# Patient Record
Sex: MALE | Race: WHITE | NOT HISPANIC OR LATINO | Employment: OTHER | URBAN - METROPOLITAN AREA
[De-identification: names, ages, dates, MRNs, and addresses within clinical notes are randomized per-mention and may not be internally consistent; named-entity substitution may affect disease eponyms.]

---

## 2019-07-22 ENCOUNTER — TELEPHONE (OUTPATIENT)
Dept: FAMILY MEDICINE | Facility: CLINIC | Age: 74
End: 2019-07-22

## 2019-07-22 RX ORDER — IRBESARTAN 300 MG/1
300 TABLET ORAL DAILY
Refills: 5 | COMMUNITY
Start: 2019-07-22 | End: 2024-01-02

## 2019-07-22 NOTE — TELEPHONE ENCOUNTER
----- Message from Shabnam Cxo sent at 7/22/2019  1:55 PM CDT -----  Contact: Milton Calderon - [facility]  [phone]  Type:  RX Refill Request    Who Called:  Milton Calderon  Refill or New Rx: Refill  RX Name and Strength: Avapro 300mg  How is the patient currently taking it? (ex. 1XDay) 1X Day  Is this a 30 day or 90 day RX: 30 day  Preferred Pharmacy with phone number: [facility] [phone]  Local or Mail Order: Local  Ordering Provider: Pierre  Would the patient rather a call back or a response via [facility]?  Call back  Best Call Back Number: [phone]  Additional Information: Pt would like to switch back to this medication due to the other med is not covered on insurance.

## 2019-07-22 NOTE — TELEPHONE ENCOUNTER
Per dr margie braden to fill avapro 300 mg daily #90/pt last office visit was 07/10/19 pt  aware that med is called out to [facility] [phone] spoke to corie

## 2020-01-03 ENCOUNTER — OFFICE VISIT (OUTPATIENT)
Dept: URGENT CARE | Facility: CLINIC | Age: 75
End: 2020-01-03
Payer: MEDICARE

## 2020-01-03 VITALS
TEMPERATURE: 98 F | HEIGHT: 71 IN | WEIGHT: 190 LBS | HEART RATE: 73 BPM | SYSTOLIC BLOOD PRESSURE: 158 MMHG | RESPIRATION RATE: 18 BRPM | BODY MASS INDEX: 26.6 KG/M2 | OXYGEN SATURATION: 96 % | DIASTOLIC BLOOD PRESSURE: 91 MMHG

## 2020-01-03 DIAGNOSIS — J06.9 VIRAL URI WITH COUGH: Primary | ICD-10-CM

## 2020-01-03 PROCEDURE — 96372 THER/PROPH/DIAG INJ SC/IM: CPT | Mod: S$GLB,,, | Performed by: NURSE PRACTITIONER

## 2020-01-03 PROCEDURE — 99203 OFFICE O/P NEW LOW 30 MIN: CPT | Mod: 25,S$GLB,, | Performed by: NURSE PRACTITIONER

## 2020-01-03 PROCEDURE — 96372 PR INJECTION,THERAP/PROPH/DIAG2ST, IM OR SUBCUT: ICD-10-PCS | Mod: S$GLB,,, | Performed by: NURSE PRACTITIONER

## 2020-01-03 PROCEDURE — 99203 PR OFFICE/OUTPT VISIT, NEW, LEVL III, 30-44 MIN: ICD-10-PCS | Mod: 25,S$GLB,, | Performed by: NURSE PRACTITIONER

## 2020-01-03 RX ORDER — LEVOCETIRIZINE DIHYDROCHLORIDE 5 MG/1
5 TABLET, FILM COATED ORAL DAILY
Qty: 30 TABLET | Refills: 0 | Status: SHIPPED | OUTPATIENT
Start: 2020-01-03 | End: 2024-01-02

## 2020-01-03 RX ORDER — BUPROPION HYDROCHLORIDE 300 MG/1
TABLET ORAL
COMMUNITY
End: 2024-01-02 | Stop reason: SDUPTHER

## 2020-01-03 RX ORDER — NAPROXEN SODIUM 220 MG/1
81 TABLET, FILM COATED ORAL
COMMUNITY
End: 2024-01-02

## 2020-01-03 RX ORDER — BETAMETHASONE SODIUM PHOSPHATE AND BETAMETHASONE ACETATE 3; 3 MG/ML; MG/ML
9 INJECTION, SUSPENSION INTRA-ARTICULAR; INTRALESIONAL; INTRAMUSCULAR; SOFT TISSUE
Status: COMPLETED | OUTPATIENT
Start: 2020-01-03 | End: 2020-01-03

## 2020-01-03 RX ORDER — BUPROPION HYDROCHLORIDE 150 MG/1
TABLET ORAL
COMMUNITY
End: 2024-01-02

## 2020-01-03 RX ORDER — LAMOTRIGINE 200 MG/1
TABLET ORAL
COMMUNITY
End: 2024-01-02 | Stop reason: SDUPTHER

## 2020-01-03 RX ORDER — CODEINE PHOSPHATE AND GUAIFENESIN 10; 100 MG/5ML; MG/5ML
5 SOLUTION ORAL 3 TIMES DAILY PRN
Qty: 118 ML | Refills: 0 | Status: SHIPPED | OUTPATIENT
Start: 2020-01-03 | End: 2020-01-13

## 2020-01-03 RX ORDER — CLONAZEPAM 0.5 MG/1
0.5 TABLET ORAL 4 TIMES DAILY
COMMUNITY
Start: 2020-01-01 | End: 2024-01-02 | Stop reason: SDUPTHER

## 2020-01-03 RX ORDER — FLUTICASONE PROPIONATE 50 MCG
2 SPRAY, SUSPENSION (ML) NASAL DAILY
Qty: 1 BOTTLE | Refills: 0 | Status: SHIPPED | OUTPATIENT
Start: 2020-01-03 | End: 2024-01-02

## 2020-01-03 RX ADMIN — BETAMETHASONE SODIUM PHOSPHATE AND BETAMETHASONE ACETATE 9 MG: 3; 3 INJECTION, SUSPENSION INTRA-ARTICULAR; INTRALESIONAL; INTRAMUSCULAR; SOFT TISSUE at 06:01

## 2020-01-03 NOTE — PROGRESS NOTES
"Subjective:       Patient ID: Terrell Albrecth is a 74 y.o. male.    Vitals:  height is 5' 11" (1.803 m) and weight is 86.2 kg (190 lb). His temperature is 98.4 °F (36.9 °C). His blood pressure is 158/91 (abnormal) and his pulse is 73. His respiration is 18 and oxygen saturation is 96%.     Chief Complaint: Cough (1 MONTH NOW )    Cough   This is a new problem. The current episode started 1 to 4 weeks ago. The problem has been gradually worsening. The problem occurs constantly. The cough is non-productive. Associated symptoms include postnasal drip. Pertinent negatives include no chills, ear pain, eye redness, fever, hemoptysis, myalgias, rash, sore throat, shortness of breath or wheezing. Nothing aggravates the symptoms. He has tried OTC cough suppressant (RX FROM [facility] ) for the symptoms. The treatment provided no relief. There is no history of asthma, bronchitis or pneumonia.       Constitution: Negative for chills, sweating, fatigue and fever.   HENT: Positive for congestion and postnasal drip. Negative for ear pain, sinus pain, sinus pressure, sore throat and voice change.    Neck: Negative for painful lymph nodes.   Eyes: Negative for eye redness.   Respiratory: Positive for cough. Negative for chest tightness, sputum production, bloody sputum, COPD, shortness of breath, stridor, wheezing and asthma.    Gastrointestinal: Negative for nausea and vomiting.   Musculoskeletal: Negative for muscle ache.   Skin: Negative for rash.   Allergic/Immunologic: Negative for seasonal allergies and asthma.   Hematologic/Lymphatic: Negative for swollen lymph nodes.       Objective:      Physical Exam   Constitutional: He is oriented to person, place, and time. He appears well-developed and well-nourished. He is cooperative.  Non-toxic appearance. He does not appear ill. No distress.   HENT:   Head: Normocephalic and atraumatic.   Right Ear: Hearing, tympanic membrane, external ear and ear canal normal.   Left Ear: " Hearing, tympanic membrane, external ear and ear canal normal.   Nose: Rhinorrhea present. No mucosal edema or nasal deformity. No epistaxis. Right sinus exhibits no maxillary sinus tenderness and no frontal sinus tenderness. Left sinus exhibits no maxillary sinus tenderness and no frontal sinus tenderness.   Mouth/Throat: Uvula is midline and mucous membranes are normal. No trismus in the jaw. Normal dentition. No uvula swelling. Posterior oropharyngeal erythema present. No oropharyngeal exudate or posterior oropharyngeal edema.   Eyes: Conjunctivae and lids are normal. Right eye exhibits no discharge. Left eye exhibits no discharge. No scleral icterus.   Neck: Trachea normal, normal range of motion, full passive range of motion without pain and phonation normal. Neck supple.   Cardiovascular: Normal rate, regular rhythm, normal heart sounds, intact distal pulses and normal pulses.   Pulmonary/Chest: Effort normal and breath sounds normal. No respiratory distress.   Abdominal: Soft. Normal appearance and bowel sounds are normal. He exhibits no distension, no pulsatile midline mass and no mass. There is no tenderness.   Musculoskeletal: Normal range of motion. He exhibits no edema or deformity.   Neurological: He is alert and oriented to person, place, and time. He exhibits normal muscle tone. Coordination normal.   Skin: Skin is warm, dry, intact, not diaphoretic and not pale.   Psychiatric: He has a normal mood and affect. His speech is normal and behavior is normal. Judgment and thought content normal. Cognition and memory are normal.   Nursing note and vitals reviewed.        Assessment:       1. Viral URI with cough        Plan:         Viral URI with cough  -     fluticasone propionate (FLONASE) 50 mcg/actuation nasal spray; 2 sprays (100 mcg total) by Each Nostril route once daily.  Dispense: 1 Bottle; Refill: 0  -     levocetirizine (XYZAL) 5 MG tablet; Take 1 tablet (5 mg total) by mouth once daily.   Dispense: 30 tablet; Refill: 0  -     guaifenesin-codeine 100-10 mg/5 ml (TUSSI-ORGANIDIN NR)  mg/5 mL syrup; Take 5 mLs by mouth 3 (three) times daily as needed.  Dispense: 118 mL; Refill: 0  -     betamethasone acetate-betamethasone sodium phosphate injection 9 mg         Please follow up with your Primary care provider within 2-5 days if your signs and symptoms have not resolved or worsen.     If your condition worsens or fails to improve we recommend that you receive another evaluation at the emergency room immediately or contact your primary medical clinic to discuss your concerns.   You must understand that you have received an Urgent Care treatment only and that you may be released before all of your medical problems are known or treated. You, the patient, will arrange for follow up care as instructed.     RED FLAGS/WARNING SYMPTOMS DISCUSSED WITH PATIENT THAT WOULD WARRANT EMERGENT MEDICAL ATTENTION. PATIENT VERBALIZED UNDERSTANDING.       Viral Upper Respiratory Illness (Adult)  You have a viral upper respiratory illness (URI), which is another term for the common cold. This illness is contagious during the first few days. It is spread through the air by coughing and sneezing. It may also be spread by direct contact (touching the sick person and then touching your own eyes, nose, or mouth). Frequent handwashing will decrease risk of spread. Most viral illnesses go away within 7 to 10 days with rest and simple home remedies. Sometimes the illness may last for several weeks. Antibiotics will not kill a virus, and they are generally not prescribed for this condition.    Home care  · If symptoms are severe, rest at home for the first 2 to 3 days. When you resume activity, don't let yourself get too tired.  · Avoid being exposed to cigarette smoke (yours or others).  · You may use acetaminophen or ibuprofen to control pain and fever, unless another medicine was prescribed. (Note: If you have chronic liver  or kidney disease, have ever had a stomach ulcer or gastrointestinal bleeding, or are taking blood-thinning medicines, talk with your healthcare provider before using these medicines.) Aspirin should never be given to anyone under 18 years of age who is ill with a viral infection or fever. It may cause severe liver or brain damage.  · Your appetite may be poor, so a light diet is fine. Avoid dehydration by drinking 6 to 8 glasses of fluids per day (water, soft drinks, juices, tea, or soup). Extra fluids will help loosen secretions in the nose and lungs.  · Over-the-counter cold medicines will not shorten the length of time youre sick, but they may be helpful for the following symptoms: cough, sore throat, and nasal and sinus congestion. (Note: Do not use decongestants if you have high blood pressure.)  Follow-up care  Follow up with your healthcare provider, or as advised.  When to seek medical advice  Call your healthcare provider right away if any of these occur:  · Cough with lots of colored sputum (mucus)  · Severe headache; face, neck, or ear pain  · Difficulty swallowing due to throat pain  · Fever of 100.4°F (38°C)  Call 911, or get immediate medical care  Call emergency services right away if any of these occur:  · Chest pain, shortness of breath, wheezing, or difficulty breathing  · Coughing up blood  · Inability to swallow due to throat pain  Date Last Reviewed: 9/13/2015  © 5784-3525 [employer]. [address] [place] [address], [place] [address]. All rights reserved. This information is not intended as a substitute for professional medical care. Always follow your healthcare professional's instructions.

## 2020-01-04 NOTE — PATIENT INSTRUCTIONS
Please follow up with your Primary care provider within 2-5 days if your signs and symptoms have not resolved or worsen.     If your condition worsens or fails to improve we recommend that you receive another evaluation at the emergency room immediately or contact your primary medical clinic to discuss your concerns.   You must understand that you have received an Urgent Care treatment only and that you may be released before all of your medical problems are known or treated. You, the patient, will arrange for follow up care as instructed.     RED FLAGS/WARNING SYMPTOMS DISCUSSED WITH PATIENT THAT WOULD WARRANT EMERGENT MEDICAL ATTENTION. PATIENT VERBALIZED UNDERSTANDING.       Viral Upper Respiratory Illness (Adult)  You have a viral upper respiratory illness (URI), which is another term for the common cold. This illness is contagious during the first few days. It is spread through the air by coughing and sneezing. It may also be spread by direct contact (touching the sick person and then touching your own eyes, nose, or mouth). Frequent handwashing will decrease risk of spread. Most viral illnesses go away within 7 to 10 days with rest and simple home remedies. Sometimes the illness may last for several weeks. Antibiotics will not kill a virus, and they are generally not prescribed for this condition.    Home care  · If symptoms are severe, rest at home for the first 2 to 3 days. When you resume activity, don't let yourself get too tired.  · Avoid being exposed to cigarette smoke (yours or others).  · You may use acetaminophen or ibuprofen to control pain and fever, unless another medicine was prescribed. (Note: If you have chronic liver or kidney disease, have ever had a stomach ulcer or gastrointestinal bleeding, or are taking blood-thinning medicines, talk with your healthcare provider before using these medicines.) Aspirin should never be given to anyone under 18 years of age who is ill with a viral infection  or fever. It may cause severe liver or brain damage.  · Your appetite may be poor, so a light diet is fine. Avoid dehydration by drinking 6 to 8 glasses of fluids per day (water, soft drinks, juices, tea, or soup). Extra fluids will help loosen secretions in the nose and lungs.  · Over-the-counter cold medicines will not shorten the length of time youre sick, but they may be helpful for the following symptoms: cough, sore throat, and nasal and sinus congestion. (Note: Do not use decongestants if you have high blood pressure.)  Follow-up care  Follow up with your healthcare provider, or as advised.  When to seek medical advice  Call your healthcare provider right away if any of these occur:  · Cough with lots of colored sputum (mucus)  · Severe headache; face, neck, or ear pain  · Difficulty swallowing due to throat pain  · Fever of 100.4°F (38°C)  Call 911, or get immediate medical care  Call emergency services right away if any of these occur:  · Chest pain, shortness of breath, wheezing, or difficulty breathing  · Coughing up blood  · Inability to swallow due to throat pain  Date Last Reviewed: 9/13/2015  © 1205-1104 [employer]. [address] [place] [address], [place] [address]. All rights reserved. This information is not intended as a substitute for professional medical care. Always follow your healthcare professional's instructions.

## 2021-01-06 ENCOUNTER — IMMUNIZATION (OUTPATIENT)
Dept: FAMILY MEDICINE | Facility: CLINIC | Age: 76
End: 2021-01-06
Payer: MEDICARE

## 2021-01-06 DIAGNOSIS — Z23 NEED FOR VACCINATION: ICD-10-CM

## 2021-01-06 PROCEDURE — 91300 COVID-19, MRNA, LNP-S, PF, 30 MCG/0.3 ML DOSE VACCINE: CPT | Mod: PBBFAC | Performed by: FAMILY MEDICINE

## 2021-01-27 ENCOUNTER — IMMUNIZATION (OUTPATIENT)
Dept: FAMILY MEDICINE | Facility: CLINIC | Age: 76
End: 2021-01-27
Payer: MEDICARE

## 2021-01-27 DIAGNOSIS — Z23 NEED FOR VACCINATION: Primary | ICD-10-CM

## 2021-01-27 PROCEDURE — 91300 COVID-19, MRNA, LNP-S, PF, 30 MCG/0.3 ML DOSE VACCINE: CPT | Mod: PBBFAC | Performed by: FAMILY MEDICINE

## 2021-01-27 PROCEDURE — 0002A COVID-19, MRNA, LNP-S, PF, 30 MCG/0.3 ML DOSE VACCINE: CPT | Mod: PBBFAC | Performed by: FAMILY MEDICINE

## 2021-03-18 ENCOUNTER — PATIENT MESSAGE (OUTPATIENT)
Dept: RESEARCH | Facility: HOSPITAL | Age: 76
End: 2021-03-18

## 2021-03-26 ENCOUNTER — PATIENT MESSAGE (OUTPATIENT)
Dept: RESEARCH | Facility: HOSPITAL | Age: 76
End: 2021-03-26

## 2021-10-07 ENCOUNTER — IMMUNIZATION (OUTPATIENT)
Dept: INTERNAL MEDICINE | Facility: CLINIC | Age: 76
End: 2021-10-07
Payer: MEDICARE

## 2021-10-07 DIAGNOSIS — Z23 NEED FOR VACCINATION: Primary | ICD-10-CM

## 2021-10-07 PROCEDURE — 91300 COVID-19, MRNA, LNP-S, PF, 30 MCG/0.3 ML DOSE VACCINE: CPT | Mod: PBBFAC | Performed by: INTERNAL MEDICINE

## 2021-10-07 PROCEDURE — 0003A COVID-19, MRNA, LNP-S, PF, 30 MCG/0.3 ML DOSE VACCINE: CPT | Mod: CV19,PBBFAC | Performed by: INTERNAL MEDICINE

## 2021-12-07 ENCOUNTER — OFFICE VISIT (OUTPATIENT)
Dept: DERMATOLOGY | Facility: CLINIC | Age: 76
End: 2021-12-07
Payer: MEDICARE

## 2021-12-07 VITALS — BODY MASS INDEX: 26.5 KG/M2 | WEIGHT: 190 LBS

## 2021-12-07 DIAGNOSIS — Z85.828 HISTORY OF SKIN CANCER: ICD-10-CM

## 2021-12-07 DIAGNOSIS — Z98.890 HISTORY OF MELANOMA EXCISION: ICD-10-CM

## 2021-12-07 DIAGNOSIS — Z85.820 HISTORY OF MELANOMA EXCISION: ICD-10-CM

## 2021-12-07 DIAGNOSIS — L81.4 LENTIGINES: Primary | ICD-10-CM

## 2021-12-07 DIAGNOSIS — D22.9 NEVUS OF MULTIPLE SITES: ICD-10-CM

## 2021-12-07 DIAGNOSIS — L82.1 SEBORRHEIC KERATOSES: ICD-10-CM

## 2021-12-07 PROCEDURE — 4010F ACE/ARB THERAPY RXD/TAKEN: CPT | Mod: CPTII,S$GLB,, | Performed by: DERMATOLOGY

## 2021-12-07 PROCEDURE — 4010F PR ACE/ARB THEARPY RXD/TAKEN: ICD-10-PCS | Mod: CPTII,S$GLB,, | Performed by: DERMATOLOGY

## 2021-12-07 PROCEDURE — 99999 PR PBB SHADOW E&M-EST. PATIENT-LVL III: CPT | Mod: PBBFAC,,, | Performed by: DERMATOLOGY

## 2021-12-07 PROCEDURE — 99202 PR OFFICE/OUTPT VISIT, NEW, LEVL II, 15-29 MIN: ICD-10-PCS | Mod: S$GLB,,, | Performed by: DERMATOLOGY

## 2021-12-07 PROCEDURE — 99999 PR PBB SHADOW E&M-EST. PATIENT-LVL III: ICD-10-PCS | Mod: PBBFAC,,, | Performed by: DERMATOLOGY

## 2021-12-07 PROCEDURE — 99202 OFFICE O/P NEW SF 15 MIN: CPT | Mod: S$GLB,,, | Performed by: DERMATOLOGY

## 2022-03-23 ENCOUNTER — PATIENT MESSAGE (OUTPATIENT)
Dept: DERMATOLOGY | Facility: CLINIC | Age: 77
End: 2022-03-23
Payer: MEDICARE

## 2022-03-31 ENCOUNTER — IMMUNIZATION (OUTPATIENT)
Dept: INTERNAL MEDICINE | Facility: CLINIC | Age: 77
End: 2022-03-31
Payer: MEDICARE

## 2022-03-31 DIAGNOSIS — Z23 NEED FOR VACCINATION: Primary | ICD-10-CM

## 2022-03-31 PROCEDURE — 91305 COVID-19, MRNA, LNP-S, PF, 30 MCG/0.3 ML DOSE VACCINE (PFIZER): CPT | Mod: PBBFAC | Performed by: INTERNAL MEDICINE

## 2022-08-25 ENCOUNTER — TELEPHONE (OUTPATIENT)
Dept: DERMATOLOGY | Facility: CLINIC | Age: 77
End: 2022-08-25
Payer: MEDICARE

## 2022-08-25 NOTE — TELEPHONE ENCOUNTER
LVM to give office a call back    ----- Message from Dali Mcdaniel MD sent at 8/24/2022  8:38 AM CDT -----  Regarding: melanoma clinic  Needs to schedule in melanoma clinic thank you

## 2022-11-02 ENCOUNTER — TELEPHONE (OUTPATIENT)
Dept: URGENT CARE | Facility: CLINIC | Age: 77
End: 2022-11-02
Payer: MEDICARE

## 2022-11-02 ENCOUNTER — OFFICE VISIT (OUTPATIENT)
Dept: URGENT CARE | Facility: CLINIC | Age: 77
End: 2022-11-02
Payer: MEDICARE

## 2022-11-02 VITALS
SYSTOLIC BLOOD PRESSURE: 168 MMHG | OXYGEN SATURATION: 97 % | BODY MASS INDEX: 28.63 KG/M2 | WEIGHT: 200 LBS | HEART RATE: 71 BPM | DIASTOLIC BLOOD PRESSURE: 82 MMHG | RESPIRATION RATE: 18 BRPM | TEMPERATURE: 99 F | HEIGHT: 70 IN

## 2022-11-02 DIAGNOSIS — M25.562 ACUTE PAIN OF LEFT KNEE: Primary | ICD-10-CM

## 2022-11-02 PROCEDURE — 3077F PR MOST RECENT SYSTOLIC BLOOD PRESSURE >= 140 MM HG: ICD-10-PCS | Mod: CPTII,S$GLB,, | Performed by: NURSE PRACTITIONER

## 2022-11-02 PROCEDURE — 3079F PR MOST RECENT DIASTOLIC BLOOD PRESSURE 80-89 MM HG: ICD-10-PCS | Mod: CPTII,S$GLB,, | Performed by: NURSE PRACTITIONER

## 2022-11-02 PROCEDURE — 73562 X-RAY EXAM OF KNEE 3: CPT | Mod: LT,S$GLB,, | Performed by: RADIOLOGY

## 2022-11-02 PROCEDURE — 99214 OFFICE O/P EST MOD 30 MIN: CPT | Mod: S$GLB,,, | Performed by: NURSE PRACTITIONER

## 2022-11-02 PROCEDURE — 99214 PR OFFICE/OUTPT VISIT, EST, LEVL IV, 30-39 MIN: ICD-10-PCS | Mod: S$GLB,,, | Performed by: NURSE PRACTITIONER

## 2022-11-02 PROCEDURE — 1125F AMNT PAIN NOTED PAIN PRSNT: CPT | Mod: CPTII,S$GLB,, | Performed by: NURSE PRACTITIONER

## 2022-11-02 PROCEDURE — 3079F DIAST BP 80-89 MM HG: CPT | Mod: CPTII,S$GLB,, | Performed by: NURSE PRACTITIONER

## 2022-11-02 PROCEDURE — 73562 XR KNEE 3 VIEW LEFT: ICD-10-PCS | Mod: LT,S$GLB,, | Performed by: RADIOLOGY

## 2022-11-02 PROCEDURE — 1159F MED LIST DOCD IN RCRD: CPT | Mod: CPTII,S$GLB,, | Performed by: NURSE PRACTITIONER

## 2022-11-02 PROCEDURE — 3077F SYST BP >= 140 MM HG: CPT | Mod: CPTII,S$GLB,, | Performed by: NURSE PRACTITIONER

## 2022-11-02 PROCEDURE — 1125F PR PAIN SEVERITY QUANTIFIED, PAIN PRESENT: ICD-10-PCS | Mod: CPTII,S$GLB,, | Performed by: NURSE PRACTITIONER

## 2022-11-02 PROCEDURE — 1159F PR MEDICATION LIST DOCUMENTED IN MEDICAL RECORD: ICD-10-PCS | Mod: CPTII,S$GLB,, | Performed by: NURSE PRACTITIONER

## 2022-11-02 PROCEDURE — 1160F RVW MEDS BY RX/DR IN RCRD: CPT | Mod: CPTII,S$GLB,, | Performed by: NURSE PRACTITIONER

## 2022-11-02 PROCEDURE — 1160F PR REVIEW ALL MEDS BY PRESCRIBER/CLIN PHARMACIST DOCUMENTED: ICD-10-PCS | Mod: CPTII,S$GLB,, | Performed by: NURSE PRACTITIONER

## 2022-11-02 NOTE — PATIENT INSTRUCTIONS
Rest - Rest the injured area, wear splint for the next week or two as needed for comfort    Ice - Apply ice  to affected area for the first 24-48 hours (DO NOT APPLY ICE DIRECTLY TO THE SKIN.  DO NOT LEAVE ON AFFECTED BODY PART FOR MORE THAN 15 MINUTES AT AT TIME TO AVOID INJURY TO SOFT TISSUE)     Compression - Wear ACE wrap or splint provided for compression and comfort to help reduce pain and swelling    Elevate - Elevated affected area higher than your heart to reduce swelling    -  If you were prescribed an anti-inflammatory, please take as directed as needed for pain and inflammation. If you were not prescribed an anti-inflammatory, you can take Tylenol or ibuprofen over the counter as directed for pain unless you have an allergy to them or medical condition such as stomach ulcers, kidney or liver disease or blood thinners etc for which you should not be taking these type of medications.     Follow up with your PCP in 1 week or as needed if no improvement.      Repeat X ray in 1 week if you still have pain.    If your condition worsens or fails to improve we recommend that you receive another evaluation at the ER immediately or contact your PCP to discuss your concerns or return here.     You must understand that you've received an urgent care treatment only and that you may be released before all your medical problems are known or treated.     You the patient will arrange for followup care as instructed.

## 2022-11-02 NOTE — TELEPHONE ENCOUNTER
Spoke with patient notified about x-ray results.  I called clinic  and  scheduled appointment with orthopedic and recommended patient to be seen by Orthopedics.  Detailed education provided about x-ray results and recommendations  All questions answered.  Francis advised.  Patient will follow-up with orthopedics as scheduled tomorrow.  Schedule time and location given to patient.  Patient voiced understanding and in agreement with current treatment plan.      X-Ray Knee 3 View Left    Result Date: 11/2/2022  EXAMINATION: XR KNEE 3 VIEW LEFT CLINICAL HISTORY: Pain in left knee TECHNIQUE: AP, lateral, and Merchant views of the left knee were performed. COMPARISON: None FINDINGS: No definite evidence of acute fracture or dislocation.  DJD.  Enthesopathic change at the patella. No definite large knee joint effusion. Extensive lobular ossific density is noted projecting at the region of the popliteal fossa.  Atherosclerotic vascular calcifications are noted.     No definite evidence of acute fracture or dislocation. Extensive lobular ossific density is noted projecting at the region of the popliteal fossa.  These findings may represent calcified intracystic bodies within a popliteal fossa/Baker's cyst.  Extra-articular synovial chondromatosis difficult to exclude.  However, benign or malignant neoplasm not excluded.  Clinical correlation recommended.  These findings could be further characterized by cross-sectional imaging. Electronically signed by: Ector Gregory Date:    11/02/2022 Time:    11:27

## 2022-11-02 NOTE — PROGRESS NOTES
"Subjective:       Patient ID: Terrell Albrecht is a 77 y.o. male.    Vitals:  height is 5' 10" (1.778 m) and weight is 90.7 kg (200 lb). His temperature is 98.9 °F (37.2 °C). His blood pressure is 168/82 (abnormal) and his pulse is 71. His respiration is 18 and oxygen saturation is 97%.     Chief Complaint: Knee Pain    Patient states he was taking dishes out of washer and felt a pop in left knee on Friday. He currently has some swelling.  Provider note below:  This is a 77 y.o. male who presents today with a chief complaint of left knee pain that started on Friday, patient reports he was taking the dishes out of washer and felt a pop in his left knee, patient reports he noticed the swelling on Saturday and Sunday, patient reports he is able to walk and pain is not bad but there is swelling present, denies any fall, denies numbness or tingling, denies radiating pain patient reports history of cyst on the back of his left knee but it is not the normal cyst that usually present in the back of the knee as notified by 3 different orthopedist to the patient, patient denies any pain to back of knee,, denies fever, body aches or chills, denies cough, wheezing or shortness of breath, denies nausea, vomiting, diarrhea or abdominal pain, denies chest pain or dizziness positional lightheadedness, denies sore throat or trouble swallowing, denies loss of taste or smell, or any other symptoms          Knee Pain   The incident occurred 3 to 5 days ago. The incident occurred at home. There was no injury mechanism. The pain is present in the left knee. The pain is at a severity of 3/10. The pain is moderate. Pertinent negatives include no inability to bear weight, muscle weakness, numbness or tingling. He reports no foreign bodies present. The symptoms are aggravated by movement, palpation and weight bearing. He has tried acetaminophen for the symptoms.     Musculoskeletal:  Positive for pain, joint pain and joint swelling.   Skin:  " Negative for erythema.   Neurological:  Negative for numbness.     Objective:      Physical Exam   Constitutional: He is oriented to person, place, and time. He appears well-developed.   HENT:   Head: Normocephalic and atraumatic. Head is without abrasion, without contusion and without laceration.   Ears:   Right Ear: External ear normal.   Left Ear: External ear normal.   Nose: Nose normal.   Mouth/Throat: Oropharynx is clear and moist and mucous membranes are normal.   Eyes: Conjunctivae, EOM and lids are normal. Pupils are equal, round, and reactive to light.   Neck: Trachea normal and phonation normal. Neck supple.   Cardiovascular: Normal rate, regular rhythm and normal heart sounds.   Pulmonary/Chest: Effort normal and breath sounds normal. No stridor. No respiratory distress. He has no decreased breath sounds. He has no wheezes. He has no rhonchi. He has no rales.   Musculoskeletal: Normal range of motion.         General: Normal range of motion.      Left knee: He exhibits swelling and effusion. He exhibits normal range of motion, no ecchymosis, no deformity, no laceration, no erythema, normal alignment and no bony tenderness. No tenderness found.      Comments: Full range of motion bilaterally with 5/5 strength  Able to ambulate with smooth rhythmic gait         Neurological: He is alert and oriented to person, place, and time.   Skin: Skin is warm, dry, intact and no rash. Capillary refill takes less than 2 seconds. not left kneeNo abrasion, No burn, No bruising, No erythema and No ecchymosis   Psychiatric: His speech is normal and behavior is normal. Judgment and thought content normal.   Nursing note and vitals reviewed.    X-Ray Knee 3 View Left    Result Date: 11/2/2022  EXAMINATION: XR KNEE 3 VIEW LEFT CLINICAL HISTORY: Pain in left knee TECHNIQUE: AP, lateral, and Merchant views of the left knee were performed. COMPARISON: None FINDINGS: No definite evidence of acute fracture or dislocation.  KUSH.   Enthesopathic change at the patella. No definite large knee joint effusion. Extensive lobular ossific density is noted projecting at the region of the popliteal fossa.  Atherosclerotic vascular calcifications are noted.     No definite evidence of acute fracture or dislocation. Extensive lobular ossific density is noted projecting at the region of the popliteal fossa.  These findings may represent calcified intracystic bodies within a popliteal fossa/Baker's cyst.  Extra-articular synovial chondromatosis difficult to exclude.  However, benign or malignant neoplasm not excluded.  Clinical correlation recommended.  These findings could be further characterized by cross-sectional imaging. Electronically signed by: Ector Gregory Date:    11/02/2022 Time:    11:27       Patient in no acute distress.  Vitals reassuring.  Discussed results/diagnosis/plan in depth with patient in clinic. Strict precautions given to patient to monitor for worsening signs and symptoms. Advised to follow up with primary.All questions answered. Strict ER precautions given. If your symptoms worsens or fail to improve you should go to the Emergency Room. Discharge and follow-up instructions given verbally/printed. Discharge and follow-up instructions discussed with the patient who expressed understanding and willingness to comply with my recommendations.Patient voiced understanding and in agreement with current treatment plan.     Please be advised this text was dictated with [employer] software and may contain errors due to translation.    Assessment:       1. Acute pain of left knee          Plan:         Acute pain of left knee  -     X-Ray Knee 3 View Left; Future; Expected date: 11/02/2022  -     Ambulatory referral/consult to Orthopedics         Medical Decision Making:   Clinical Tests:   Radiological Study: Ordered and Reviewed  Urgent Care Management:  Patient in no acute distress.  Vitals reassuring.  On exam, patient is nontoxic  appearing afebrile.  Physical examination consistent with left knee swelling and effusion.  Able to ambulate without assistance.  X-ray unavailable at this location.  Patient will go to [facility] for [facility]-ray.  Rice advised.  Over-the-counter medications discussed.  I called patient and discussed x-ray results with patient in detail and notified about the recommendations.  I referred patient to orthopedics and called clinic  to schedule appointment.  Patient is scheduled to follow-up with orthopedics on 11/03/2022.  Appointment time and location given to patient.  All questions answered.Patient voiced understanding and in agreement with current treatment plan.           Patient Instructions     Rest - Rest the injured area, wear splint for the next week or two as needed for comfort    Ice - Apply ice  to affected area for the first 24-48 hours (DO NOT APPLY ICE DIRECTLY TO THE SKIN.  DO NOT LEAVE ON AFFECTED BODY PART FOR MORE THAN 15 MINUTES AT AT TIME TO AVOID INJURY TO SOFT TISSUE)     Compression - Wear ACE wrap or splint provided for compression and comfort to help reduce pain and swelling    Elevate - Elevated affected area higher than your heart to reduce swelling    -  If you were prescribed an anti-inflammatory, please take as directed as needed for pain and inflammation. If you were not prescribed an anti-inflammatory, you can take Tylenol or ibuprofen over the counter as directed for pain unless you have an allergy to them or medical condition such as stomach ulcers, kidney or liver disease or blood thinners etc for which you should not be taking these type of medications.     Follow up with your PCP in 1 week or as needed if no improvement.      Repeat X ray in 1 week if you still have pain.    If your condition worsens or fails to improve we recommend that you receive another evaluation at the ER immediately or contact your PCP to discuss your concerns or return here.     You must  understand that you've received an urgent care treatment only and that you may be released before all your medical problems are known or treated.     You the patient will arrange for followup care as instructed.

## 2022-11-03 ENCOUNTER — OFFICE VISIT (OUTPATIENT)
Dept: ORTHOPEDICS | Facility: CLINIC | Age: 77
End: 2022-11-03
Payer: MEDICARE

## 2022-11-03 VITALS — WEIGHT: 205 LBS | BODY MASS INDEX: 29.35 KG/M2 | HEIGHT: 70 IN

## 2022-11-03 DIAGNOSIS — M17.11 PRIMARY OSTEOARTHRITIS OF RIGHT KNEE: Primary | ICD-10-CM

## 2022-11-03 PROCEDURE — 1101F PR PT FALLS ASSESS DOC 0-1 FALLS W/OUT INJ PAST YR: ICD-10-PCS | Mod: CPTII,S$GLB,, | Performed by: PHYSICIAN ASSISTANT

## 2022-11-03 PROCEDURE — 99999 PR PBB SHADOW E&M-EST. PATIENT-LVL III: CPT | Mod: PBBFAC,,, | Performed by: PHYSICIAN ASSISTANT

## 2022-11-03 PROCEDURE — 99213 OFFICE O/P EST LOW 20 MIN: CPT | Mod: S$GLB,,, | Performed by: PHYSICIAN ASSISTANT

## 2022-11-03 PROCEDURE — 1125F AMNT PAIN NOTED PAIN PRSNT: CPT | Mod: CPTII,S$GLB,, | Performed by: PHYSICIAN ASSISTANT

## 2022-11-03 PROCEDURE — 1101F PT FALLS ASSESS-DOCD LE1/YR: CPT | Mod: CPTII,S$GLB,, | Performed by: PHYSICIAN ASSISTANT

## 2022-11-03 PROCEDURE — 99213 PR OFFICE/OUTPT VISIT, EST, LEVL III, 20-29 MIN: ICD-10-PCS | Mod: S$GLB,,, | Performed by: PHYSICIAN ASSISTANT

## 2022-11-03 PROCEDURE — 1125F PR PAIN SEVERITY QUANTIFIED, PAIN PRESENT: ICD-10-PCS | Mod: CPTII,S$GLB,, | Performed by: PHYSICIAN ASSISTANT

## 2022-11-03 PROCEDURE — 3288F PR FALLS RISK ASSESSMENT DOCUMENTED: ICD-10-PCS | Mod: CPTII,S$GLB,, | Performed by: PHYSICIAN ASSISTANT

## 2022-11-03 PROCEDURE — 1160F PR REVIEW ALL MEDS BY PRESCRIBER/CLIN PHARMACIST DOCUMENTED: ICD-10-PCS | Mod: CPTII,S$GLB,, | Performed by: PHYSICIAN ASSISTANT

## 2022-11-03 PROCEDURE — 1159F MED LIST DOCD IN RCRD: CPT | Mod: CPTII,S$GLB,, | Performed by: PHYSICIAN ASSISTANT

## 2022-11-03 PROCEDURE — 3288F FALL RISK ASSESSMENT DOCD: CPT | Mod: CPTII,S$GLB,, | Performed by: PHYSICIAN ASSISTANT

## 2022-11-03 PROCEDURE — 99999 PR PBB SHADOW E&M-EST. PATIENT-LVL III: ICD-10-PCS | Mod: PBBFAC,,, | Performed by: PHYSICIAN ASSISTANT

## 2022-11-03 PROCEDURE — 1160F RVW MEDS BY RX/DR IN RCRD: CPT | Mod: CPTII,S$GLB,, | Performed by: PHYSICIAN ASSISTANT

## 2022-11-03 PROCEDURE — 1159F PR MEDICATION LIST DOCUMENTED IN MEDICAL RECORD: ICD-10-PCS | Mod: CPTII,S$GLB,, | Performed by: PHYSICIAN ASSISTANT

## 2022-11-03 NOTE — PROGRESS NOTES
SUBJECTIVE:     Chief Complaint   Patient presents with    Left Knee - Pain       History of Present Illness:  Terrell Albrecht is a 77 y.o. year old male here with complaints of intermittent left knee pain which started over 25 years ago.  He twisted he knee one week ago and felt a pop with severe pain.  He was seen in urgent care yesterday. The pain is located in the medial, popliteral aspect of the knee.  The pain is described as achy.  There is swelling and stiffness. There is not catching or locking.  Aggravating factors include standing and walking.  He states he has been able to feel a mass in popliteal fossa for many years- has been told by 2 previous orthopedic surgeons to manage conservatively.  States it has not changed in size in over 20 years. Previous treatments include ice, rest, and elevation and compression, tylenol.  He states his pain has improved. There is not a history of previous injury or surgery to the knee.  The patient does not use an assistive device.    Review of patient's allergies indicates:   Allergen Reactions    Sulfa (sulfonamide antibiotics)          Current Outpatient Medications   Medication Sig Dispense Refill    aspirin 81 MG Chew 81 mg.      buPROPion (WELLBUTRIN XL) 150 MG TB24 tablet bupropion HCl  mg 24 hr tablet, extended release      buPROPion (WELLBUTRIN XL) 300 MG 24 hr tablet bupropion HCl  mg 24 hr tablet, extended release      clonazePAM (KLONOPIN) 0.5 MG tablet       fluticasone propionate (FLONASE) 50 mcg/actuation nasal spray 2 sprays (100 mcg total) by Each Nostril route once daily. 1 Bottle 0    irbesartan (AVAPRO) 300 MG tablet Take 300 mg by mouth once daily.  5    lamoTRIgine (LAMICTAL) 200 MG tablet lamotrigine 200 mg tablet      levocetirizine (XYZAL) 5 MG tablet Take 1 tablet (5 mg total) by mouth once daily. 30 tablet 0     No current facility-administered medications for this visit.       No past medical history on file.    No past surgical  "history on file.      Review of Systems:  ROS:  Constitutional: no fever or chills  Eyes: no visual changes  ENT: no nasal congestion or sore throat  Respiratory: no cough or shortness of breath  Musculoskeletal: no arthralgias or myalgias      OBJECTIVE:     PHYSICAL EXAM:  Height: 5' 10" (177.8 cm) Weight: 93 kg (205 lb)   General: Well developed, well nourished, in no acute distress.  Neurological: Mood & affect are normal.  HEENT: NCAT, sclera nonicteric   Lungs: Respirations are equal and unlabored.   CV: 2+ bilateral upper and lower extremity pulses.   Skin: Intact throughout with no rashes, erythema, or lesions  Extremities: No LE edema, no erythema or warmth of the skin in either lower extremity.    left Knee Exam:  Knee Range of Motion: 5-120   Effusion: mild  Condition of skin: intact and no warmth  Location of tenderness: Medial joint line and popliteal fossa  Palpable hard nodule felt in medial poplitea fossa   Strength: 5 of 5 quadriceps strength and 5 of 5 hamstring strength  Stability:  stable to testing  Varus /Valgus stress:  normal    IMAGING:    X-rays of the left knee, personally reviewed by me, demonstrate 2 calcified/ossific bodies in popliteal fossa and mild-moderate osteoarthritis.  No fracture or dislocation.     ASSESSMENT/PLAN:   77 y.o. year old male with left knee osteoarthritis    Plan: We discussed with the patient at length all the different treatment options available for the knee including NSAIDS, acetaminophen, rest, ice, knee strengthening exercise, steroid injections for temporary relief, Viscosupplementation, and knee arthroplasty.     - Discussed imaging- as he has been seen by previous orthopedic surgeons who have advised of risk of operating in this area of the knee I would also recommend continued monitoring.  These have remained stable in size over many years.  Recommend follow up in 6 months for re-imaging  - Can consider CSI for osteoarthritis- pain has subsided so we " will hold off today  - Follow up 6 months, sooner if needed

## 2022-11-17 ENCOUNTER — OFFICE VISIT (OUTPATIENT)
Dept: ORTHOPEDICS | Facility: CLINIC | Age: 77
End: 2022-11-17
Payer: MEDICARE

## 2022-11-17 VITALS
DIASTOLIC BLOOD PRESSURE: 76 MMHG | HEART RATE: 91 BPM | SYSTOLIC BLOOD PRESSURE: 126 MMHG | HEIGHT: 70 IN | BODY MASS INDEX: 29.13 KG/M2 | WEIGHT: 203.5 LBS

## 2022-11-17 DIAGNOSIS — M25.562 ACUTE PAIN OF LEFT KNEE: Primary | ICD-10-CM

## 2022-11-17 DIAGNOSIS — M17.12 PRIMARY OSTEOARTHRITIS OF LEFT KNEE: ICD-10-CM

## 2022-11-17 DIAGNOSIS — M62.81 QUADRICEPS WEAKNESS: ICD-10-CM

## 2022-11-17 PROCEDURE — 1101F PT FALLS ASSESS-DOCD LE1/YR: CPT | Mod: CPTII,S$GLB,, | Performed by: ORTHOPAEDIC SURGERY

## 2022-11-17 PROCEDURE — 99999 PR PBB SHADOW E&M-EST. PATIENT-LVL III: CPT | Mod: PBBFAC,,, | Performed by: ORTHOPAEDIC SURGERY

## 2022-11-17 PROCEDURE — 1101F PR PT FALLS ASSESS DOC 0-1 FALLS W/OUT INJ PAST YR: ICD-10-PCS | Mod: CPTII,S$GLB,, | Performed by: ORTHOPAEDIC SURGERY

## 2022-11-17 PROCEDURE — 3074F SYST BP LT 130 MM HG: CPT | Mod: CPTII,S$GLB,, | Performed by: ORTHOPAEDIC SURGERY

## 2022-11-17 PROCEDURE — 3078F PR MOST RECENT DIASTOLIC BLOOD PRESSURE < 80 MM HG: ICD-10-PCS | Mod: CPTII,S$GLB,, | Performed by: ORTHOPAEDIC SURGERY

## 2022-11-17 PROCEDURE — 1159F PR MEDICATION LIST DOCUMENTED IN MEDICAL RECORD: ICD-10-PCS | Mod: CPTII,S$GLB,, | Performed by: ORTHOPAEDIC SURGERY

## 2022-11-17 PROCEDURE — 99214 OFFICE O/P EST MOD 30 MIN: CPT | Mod: 25,S$GLB,, | Performed by: ORTHOPAEDIC SURGERY

## 2022-11-17 PROCEDURE — 99214 PR OFFICE/OUTPT VISIT, EST, LEVL IV, 30-39 MIN: ICD-10-PCS | Mod: 25,S$GLB,, | Performed by: ORTHOPAEDIC SURGERY

## 2022-11-17 PROCEDURE — 1125F PR PAIN SEVERITY QUANTIFIED, PAIN PRESENT: ICD-10-PCS | Mod: CPTII,S$GLB,, | Performed by: ORTHOPAEDIC SURGERY

## 2022-11-17 PROCEDURE — 20610 LARGE JOINT ASPIRATION/INJECTION: L KNEE: ICD-10-PCS | Mod: LT,S$GLB,, | Performed by: ORTHOPAEDIC SURGERY

## 2022-11-17 PROCEDURE — 3078F DIAST BP <80 MM HG: CPT | Mod: CPTII,S$GLB,, | Performed by: ORTHOPAEDIC SURGERY

## 2022-11-17 PROCEDURE — 99999 PR PBB SHADOW E&M-EST. PATIENT-LVL III: ICD-10-PCS | Mod: PBBFAC,,, | Performed by: ORTHOPAEDIC SURGERY

## 2022-11-17 PROCEDURE — 3288F PR FALLS RISK ASSESSMENT DOCUMENTED: ICD-10-PCS | Mod: CPTII,S$GLB,, | Performed by: ORTHOPAEDIC SURGERY

## 2022-11-17 PROCEDURE — 1159F MED LIST DOCD IN RCRD: CPT | Mod: CPTII,S$GLB,, | Performed by: ORTHOPAEDIC SURGERY

## 2022-11-17 PROCEDURE — 3288F FALL RISK ASSESSMENT DOCD: CPT | Mod: CPTII,S$GLB,, | Performed by: ORTHOPAEDIC SURGERY

## 2022-11-17 PROCEDURE — 3074F PR MOST RECENT SYSTOLIC BLOOD PRESSURE < 130 MM HG: ICD-10-PCS | Mod: CPTII,S$GLB,, | Performed by: ORTHOPAEDIC SURGERY

## 2022-11-17 PROCEDURE — 20610 DRAIN/INJ JOINT/BURSA W/O US: CPT | Mod: LT,S$GLB,, | Performed by: ORTHOPAEDIC SURGERY

## 2022-11-17 PROCEDURE — 1125F AMNT PAIN NOTED PAIN PRSNT: CPT | Mod: CPTII,S$GLB,, | Performed by: ORTHOPAEDIC SURGERY

## 2022-11-17 RX ORDER — BUPIVACAINE HYDROCHLORIDE 5 MG/ML
5 INJECTION, SOLUTION PERINEURAL
Status: DISCONTINUED | OUTPATIENT
Start: 2022-11-17 | End: 2022-11-17 | Stop reason: HOSPADM

## 2022-11-17 RX ORDER — LIDOCAINE HYDROCHLORIDE 10 MG/ML
4 INJECTION INFILTRATION; PERINEURAL
Status: DISCONTINUED | OUTPATIENT
Start: 2022-11-17 | End: 2022-11-17 | Stop reason: HOSPADM

## 2022-11-17 RX ORDER — KETOROLAC TROMETHAMINE 30 MG/ML
30 INJECTION, SOLUTION INTRAMUSCULAR; INTRAVENOUS
Status: DISCONTINUED | OUTPATIENT
Start: 2022-11-17 | End: 2022-11-17 | Stop reason: HOSPADM

## 2022-11-17 RX ADMIN — BUPIVACAINE HYDROCHLORIDE 5 ML: 5 INJECTION, SOLUTION PERINEURAL at 09:11

## 2022-11-17 RX ADMIN — KETOROLAC TROMETHAMINE 30 MG: 30 INJECTION, SOLUTION INTRAMUSCULAR; INTRAVENOUS at 09:11

## 2022-11-17 RX ADMIN — LIDOCAINE HYDROCHLORIDE 4 ML: 10 INJECTION INFILTRATION; PERINEURAL at 09:11

## 2022-11-17 NOTE — PROGRESS NOTES
Subjective:      Patient ID: Terrell Albrecht is a 77 y.o. male.    Chief Complaint: Pain of the Left Knee      Patient ID: Terrell Albrecht is a 77 y.o. male.    Chief Complaint: Pain of the Left Knee      KNEE PAIN: Complains of pain to the leftknee.   PAIN LOCATED: anterior and medial  ONSET: 3 weeks ago.  QUALITY:  Patient states the pain is worsening  HISTORY: Previous knee injury/surgery: yes  Hx: twisting while stooping down  ASSOCIATED SYMPTOM AND TRIGGERS:Standing/Weightbearing, walking, trouble w stairs, stiffness, swelling, limping, stiffness w sitting , Knee gives way, and Knee Feels Unsteady  USES ASSISTIVE DEVICE: none  RELIEVED BY:rest, heat, ice, medication: Tylenol used but not effective  PATIENT DENIES: bruising, redness, deformity            Social History     Occupational History    Not on file   Tobacco Use    Smoking status: Never    Smokeless tobacco: Not on file   Substance and Sexual Activity    Alcohol use: Never    Drug use: Never    Sexual activity: Not on file      Review of Systems   Constitutional: Negative for diaphoresis.   HENT:  Negative for ear discharge, nosebleeds and stridor.    Eyes:  Negative for photophobia.   Cardiovascular:  Negative for syncope.   Respiratory:  Negative for hemoptysis, shortness of breath and wheezing.    Neurological:  Negative for tremors.   Psychiatric/Behavioral: Negative.         Objective:    General    Constitutional: He is oriented to person, place, and time. He appears well-developed and well-nourished.   HENT:   Head: Normocephalic and atraumatic.   Right Ear: External ear normal.   Left Ear: External ear normal.   Eyes: EOM are normal.   Pulmonary/Chest: Effort normal.   Neurological: He is alert and oriented to person, place, and time.   Psychiatric: He has a normal mood and affect. His behavior is normal. Judgment and thought content normal.     General Musculoskeletal Exam   Gait: antalgic and abnormal       Right Knee Exam     Range of Motion    Extension:  0   Flexion:  120     Left Knee Exam     Inspection   Swelling: present  Effusion: present    Tenderness   The patient tender to palpation of the medial joint line.    Crepitus   The patient has crepitus of the patella.    Range of Motion   Extension:  0   Flexion:  100     Tests   Meniscus   Godfrey:  Medial - positive   Stability   MCL - Valgus: normal (0 to 2mm)  LCL - Varus: normal (0 to 2mm)    Other   Sensation: normal    Muscle Strength   Left Lower Extremity   Quadriceps:  4/5   Hamstrin/5        Assessment:       1. Acute pain of left knee    2. Primary osteoarthritis of left knee    3. Quadriceps weakness          Plan:       we injected the left knee w 1cc of ketorolac, marcaine and lidocaine under sterile conditions with the patient's informed consent for mild/moderate knee oa.   we will try a course of PT before ordering an MRI. Patient should f/u with me after approx 1 mo of PT to consider an MRI at that point for possible meniscal pathology

## 2022-11-17 NOTE — PROCEDURES
Large Joint Aspiration/Injection: L knee    Date/Time: 11/17/2022 9:00 AM  Performed by: Rishi Joshi MD  Authorized by: Rishi Joshi MD     Consent Done?:  Yes (Verbal)  Indications:  Arthritis  Site marked: the procedure site was marked    Timeout: prior to procedure the correct patient, procedure, and site was verified    Prep: patient was prepped and draped in usual sterile fashion      Local anesthesia used?: Yes    Local anesthetic:  Topical anesthetic and lidocaine 1% without epinephrine    Details:  Needle Size:  22 G  Ultrasonic Guidance for needle placement?: No    Approach:  Anteromedial  Location:  Knee  Site:  L knee  Medications:  4 mL LIDOcaine HCL 10 mg/ml (1%) 10 mg/mL (1 %); 5 mL BUPivacaine 0.5 % (5 mg/mL); 30 mg ketorolac 30 mg/mL (1 mL)  Patient tolerance:  Patient tolerated the procedure well with no immediate complications

## 2022-12-04 ENCOUNTER — PATIENT MESSAGE (OUTPATIENT)
Dept: INTERNAL MEDICINE | Facility: CLINIC | Age: 77
End: 2022-12-04
Payer: MEDICARE

## 2022-12-09 ENCOUNTER — PATIENT MESSAGE (OUTPATIENT)
Dept: INTERNAL MEDICINE | Facility: CLINIC | Age: 77
End: 2022-12-09
Payer: MEDICARE

## 2024-01-02 ENCOUNTER — OFFICE VISIT (OUTPATIENT)
Dept: PRIMARY CARE CLINIC | Facility: CLINIC | Age: 79
End: 2024-01-02
Payer: MEDICARE

## 2024-01-02 ENCOUNTER — PATIENT MESSAGE (OUTPATIENT)
Dept: PRIMARY CARE CLINIC | Facility: CLINIC | Age: 79
End: 2024-01-02

## 2024-01-02 ENCOUNTER — LAB VISIT (OUTPATIENT)
Dept: LAB | Facility: HOSPITAL | Age: 79
End: 2024-01-02
Attending: INTERNAL MEDICINE
Payer: MEDICARE

## 2024-01-02 VITALS
BODY MASS INDEX: 28.66 KG/M2 | DIASTOLIC BLOOD PRESSURE: 82 MMHG | SYSTOLIC BLOOD PRESSURE: 138 MMHG | HEART RATE: 74 BPM | HEIGHT: 70 IN | WEIGHT: 200.19 LBS | OXYGEN SATURATION: 97 %

## 2024-01-02 DIAGNOSIS — E03.9 ACQUIRED HYPOTHYROIDISM: ICD-10-CM

## 2024-01-02 DIAGNOSIS — I35.0 NONRHEUMATIC AORTIC VALVE STENOSIS: ICD-10-CM

## 2024-01-02 DIAGNOSIS — I10 ESSENTIAL HYPERTENSION: Primary | ICD-10-CM

## 2024-01-02 DIAGNOSIS — M17.12 PRIMARY OSTEOARTHRITIS OF LEFT KNEE: ICD-10-CM

## 2024-01-02 DIAGNOSIS — F31.9 BIPOLAR I DISORDER: ICD-10-CM

## 2024-01-02 DIAGNOSIS — E66.3 OVERWEIGHT: ICD-10-CM

## 2024-01-02 DIAGNOSIS — R73.03 PREDIABETES: ICD-10-CM

## 2024-01-02 PROBLEM — M62.81 QUADRICEPS WEAKNESS: Status: RESOLVED | Noted: 2022-11-17 | Resolved: 2024-01-02

## 2024-01-02 PROBLEM — M25.562 ACUTE PAIN OF LEFT KNEE: Status: RESOLVED | Noted: 2022-11-17 | Resolved: 2024-01-02

## 2024-01-02 LAB
T4 FREE SERPL-MCNC: 1.06 NG/DL (ref 0.71–1.51)
TSH SERPL DL<=0.005 MIU/L-ACNC: 4.27 UIU/ML (ref 0.4–4)

## 2024-01-02 PROCEDURE — 1159F MED LIST DOCD IN RCRD: CPT | Mod: CPTII,S$GLB,, | Performed by: INTERNAL MEDICINE

## 2024-01-02 PROCEDURE — 84439 ASSAY OF FREE THYROXINE: CPT | Performed by: INTERNAL MEDICINE

## 2024-01-02 PROCEDURE — 3079F DIAST BP 80-89 MM HG: CPT | Mod: CPTII,S$GLB,, | Performed by: INTERNAL MEDICINE

## 2024-01-02 PROCEDURE — 1126F AMNT PAIN NOTED NONE PRSNT: CPT | Mod: CPTII,S$GLB,, | Performed by: INTERNAL MEDICINE

## 2024-01-02 PROCEDURE — 84443 ASSAY THYROID STIM HORMONE: CPT | Performed by: INTERNAL MEDICINE

## 2024-01-02 PROCEDURE — 99999 PR PBB SHADOW E&M-EST. PATIENT-LVL III: CPT | Mod: PBBFAC,,, | Performed by: INTERNAL MEDICINE

## 2024-01-02 PROCEDURE — 36415 COLL VENOUS BLD VENIPUNCTURE: CPT | Performed by: INTERNAL MEDICINE

## 2024-01-02 PROCEDURE — 3075F SYST BP GE 130 - 139MM HG: CPT | Mod: CPTII,S$GLB,, | Performed by: INTERNAL MEDICINE

## 2024-01-02 PROCEDURE — 99397 PER PM REEVAL EST PAT 65+ YR: CPT | Mod: S$GLB,,, | Performed by: INTERNAL MEDICINE

## 2024-01-02 RX ORDER — BUPROPION HYDROCHLORIDE 300 MG/1
300 TABLET ORAL DAILY
Qty: 90 TABLET | Refills: 3 | Status: SHIPPED | OUTPATIENT
Start: 2024-01-02

## 2024-01-02 RX ORDER — LEVOTHYROXINE SODIUM 88 UG/1
88 TABLET ORAL
Qty: 90 TABLET | Refills: 3 | Status: SHIPPED | OUTPATIENT
Start: 2024-01-02

## 2024-01-02 RX ORDER — LEVOTHYROXINE SODIUM 88 UG/1
88 TABLET ORAL
COMMUNITY
End: 2024-01-02 | Stop reason: SDUPTHER

## 2024-01-02 RX ORDER — LAMOTRIGINE 200 MG/1
200 TABLET ORAL DAILY
Qty: 90 TABLET | Refills: 3 | Status: SHIPPED | OUTPATIENT
Start: 2024-01-02

## 2024-01-02 RX ORDER — LOSARTAN POTASSIUM 100 MG/1
100 TABLET ORAL DAILY
Qty: 90 TABLET | Refills: 3 | Status: SHIPPED | OUTPATIENT
Start: 2024-01-02

## 2024-01-02 RX ORDER — CLONAZEPAM 0.5 MG/1
0.5 TABLET ORAL 4 TIMES DAILY
Qty: 120 TABLET | Refills: 5 | Status: SHIPPED | OUTPATIENT
Start: 2024-01-02

## 2024-01-02 RX ORDER — ALFUZOSIN HYDROCHLORIDE 10 MG/1
10 TABLET, EXTENDED RELEASE ORAL
COMMUNITY

## 2024-01-02 RX ORDER — LOSARTAN POTASSIUM 100 MG/1
100 TABLET ORAL DAILY
COMMUNITY
End: 2024-01-02 | Stop reason: SDUPTHER

## 2024-01-02 NOTE — PROGRESS NOTES
[facility] Note    Chief Complaint      Chief Complaint   Patient presents with    Establish Care     History of Present Illness      Terrell Albrecht is a 78 y.o. male who presents today for Annual preventative visit.  Patient comes to appointment alone.  Cards: [facility], swapping to [facility] (for aortic stenosis), Uro: [place], Psych: [place]    Problem List Items Addressed This Visit       Primary osteoarthritis of left knee    Current Assessment & Plan     Had 2 meniscal tears, seen by Dr. Ramos which healed on their own.         Bipolar I disorder    Current Assessment & Plan     Stable, on wellbutrin, lamictal, clonazepam PRN.           Relevant Medications    lamoTRIgine (LAMICTAL) 200 MG tablet    clonazePAM (KLONOPIN) 0.5 MG tablet    buPROPion (WELLBUTRIN XL) 300 MG 24 hr tablet    Essential hypertension - Primary    Current Assessment & Plan     Bp controlled on Losartan 100 mg daily, no CP/SOB/HA.  Not watching salt in diet.         Relevant Medications    losartan (COZAAR) 100 MG tablet    Other Relevant Orders    CBC Auto Differential    Lipid Panel    Comprehensive Metabolic Panel    Nonrheumatic aortic valve stenosis    Overweight    Current Assessment & Plan     Boxing 6 times per week.  Likes sweets.         Prediabetes    Current Assessment & Plan     Not on meds, doing low carb diet.         Relevant Orders    Hemoglobin A1C     Other Visit Diagnoses       Acquired hypothyroidism        Relevant Medications    levothyroxine (SYNTHROID) 88 MCG tablet    Other Relevant Orders    TSH    T4, FREE    TSH    T4, FREE            Health Maintenance   Topic Date Due    Hepatitis C Screening  Never done    TETANUS VACCINE  Never done    Shingles Vaccine (2 of 3) 09/06/2016    Lipid Panel  08/05/2028       History reviewed. No pertinent past medical history.    Past Surgical History:   Procedure Laterality Date    INSERTION OF PENILE IMPLANT BY INFRAPUBIC APPROACH      ROTATOR CUFF REPAIR  Right        family history includes Arthritis in his mother; Cancer in his mother; Depression in his brother and sister; Diabetes in his brother; Heart disease in his mother; Hypertension in his mother.    Social History     Tobacco Use    Smoking status: Never   Substance Use Topics    Alcohol use: Not Currently     Comment: Sober Active Member of [employer] for 43 years    Drug use: Never       Review of Systems   Constitutional:  Negative for chills and fever.   HENT:  Negative for hearing loss.    Eyes:  Negative for discharge.   Respiratory:  Negative for cough, shortness of breath and wheezing.    Cardiovascular:  Negative for chest pain and palpitations.   Gastrointestinal:  Negative for blood in stool, constipation, diarrhea, nausea and vomiting.   Genitourinary:  Negative for dysuria, hematuria and urgency.   Musculoskeletal:  Negative for falls and neck pain.   Neurological:  Negative for weakness and headaches.   Endo/Heme/Allergies:  Negative for polydipsia.        Outpatient Encounter Medications as of 1/2/2024   Medication Sig Dispense Refill    alfuzosin (UROXATRAL) 10 mg Tb24 Take 10 mg by mouth daily with breakfast.      [DISCONTINUED] buPROPion (WELLBUTRIN XL) 300 MG 24 hr tablet bupropion HCl  mg 24 hr tablet, extended release      [DISCONTINUED] clonazePAM (KLONOPIN) 0.5 MG tablet Take 0.5 mg by mouth 4 (four) times daily.      [DISCONTINUED] lamoTRIgine (LAMICTAL) 200 MG tablet lamotrigine 200 mg tablet      [DISCONTINUED] levothyroxine (SYNTHROID) 88 MCG tablet Take 88 mcg by mouth before breakfast.      [DISCONTINUED] losartan (COZAAR) 100 MG tablet Take 100 mg by mouth once daily.      buPROPion (WELLBUTRIN XL) 300 MG 24 hr tablet Take 1 tablet (300 mg total) by mouth once daily. 90 tablet 3    clonazePAM (KLONOPIN) 0.5 MG tablet Take 1 tablet (0.5 mg total) by mouth 4 (four) times daily. 120 tablet 5    lamoTRIgine (LAMICTAL) 200 MG tablet Take 1 tablet (200 mg total) by mouth once daily. 90  "tablet 3    levothyroxine (SYNTHROID) 88 MCG tablet Take 1 tablet (88 mcg total) by mouth before breakfast. 90 tablet 3    losartan (COZAAR) 100 MG tablet Take 1 tablet (100 mg total) by mouth once daily. 90 tablet 3    [DISCONTINUED] aspirin 81 MG Chew 81 mg.      [DISCONTINUED] buPROPion (WELLBUTRIN XL) 150 MG TB24 tablet bupropion HCl  mg 24 hr tablet, extended release      [DISCONTINUED] fluticasone propionate (FLONASE) 50 mcg/actuation nasal spray 2 sprays (100 mcg total) by Each Nostril route once daily. 1 Bottle 0    [DISCONTINUED] irbesartan (AVAPRO) 300 MG tablet Take 300 mg by mouth once daily.  5    [DISCONTINUED] levocetirizine (XYZAL) 5 MG tablet Take 1 tablet (5 mg total) by mouth once daily. 30 tablet 0     No facility-administered encounter medications on file as of 1/2/2024.        Review of patient's allergies indicates:   Allergen Reactions    Sulfa (sulfonamide antibiotics)        Physical Exam      Vital Signs  Pulse: 74  SpO2: 97 %  BP: 138/82  Pain Score: 0-No pain  Height and Weight  Height: 5' 10" (177.8 cm)  Weight: 90.8 kg (200 lb 2.8 oz)  BSA (Calculated - sq m): 2.12 sq meters  BMI (Calculated): 28.7  Weight in (lb) to have BMI = 25: 173.9]    Physical Exam  Constitutional:       Appearance: He is well-developed.   HENT:      Head: Normocephalic and atraumatic.   Neck:      Thyroid: No thyromegaly.   Cardiovascular:      Rate and Rhythm: Normal rate and regular rhythm.      Heart sounds: No murmur heard.  Pulmonary:      Effort: Pulmonary effort is normal. No respiratory distress.      Breath sounds: Normal breath sounds.   Abdominal:      General: There is no distension.      Palpations: Abdomen is soft.      Tenderness: There is no abdominal tenderness.   Skin:     General: Skin is warm and dry.   Neurological:      Mental Status: He is alert and oriented to person, place, and time.   Psychiatric:         Behavior: Behavior normal.          Laboratory:  CBC:  No results for " "input(s): "WBC", "RBC", "HGB", "HCT", "PLT", "MCV", "MCH", "MCHC" in the last 2160 hours.  CMP:  No results for input(s): "GLU", "CALCIUM", "ALBUMIN", "PROT", "NA", "K", "CO2", "CL", "BUN", "ALKPHOS", "ALT", "AST", "BILITOT" in the last 2160 hours.    Invalid input(s): "CREATININ"  URINALYSIS:  No results for input(s): "COLORU", "CLARITYU", "SPECGRAV", "PHUR", "PROTEINUA", "GLUCOSEU", "BILIRUBINCON", "BLOODU", "WBCU", "RBCU", "BACTERIA", "MUCUS", "NITRITE", "LEUKOCYTESUR", "UROBILINOGEN", "HYALINECASTS" in the last 2160 hours.   LIPIDS:  No results for input(s): "TSH", "HDL", "CHOL", "TRIG", "LDLCALC", "CHOLHDL", "NONHDLCHOL", "TOTALCHOLEST" in the last 2160 hours.  TSH:  No results for input(s): "TSH" in the last 2160 hours.  A1C:  No results for input(s): "HGBA1C" in the last 2160 hours.    Radiology:  No results found in the last 30 days.     Assessment/Plan     Terrell Albrecht is a 78 y.o.male with:    1. Essential hypertension  - losartan (COZAAR) 100 MG tablet; Take 1 tablet (100 mg total) by mouth once daily.  Dispense: 90 tablet; Refill: 3  - CBC Auto Differential; Future  - Lipid Panel; Future  - Comprehensive Metabolic Panel; Future    2. Acquired hypothyroidism  - TSH; Future  - T4, FREE; Future  - levothyroxine (SYNTHROID) 88 MCG tablet; Take 1 tablet (88 mcg total) by mouth before breakfast.  Dispense: 90 tablet; Refill: 3  - TSH; Future  - T4, FREE; Future    3. Bipolar I disorder  - lamoTRIgine (LAMICTAL) 200 MG tablet; Take 1 tablet (200 mg total) by mouth once daily.  Dispense: 90 tablet; Refill: 3  - clonazePAM (KLONOPIN) 0.5 MG tablet; Take 1 tablet (0.5 mg total) by mouth 4 (four) times daily.  Dispense: 120 tablet; Refill: 5  - buPROPion (WELLBUTRIN XL) 300 MG 24 hr tablet; Take 1 tablet (300 mg total) by mouth once daily.  Dispense: 90 tablet; Refill: 3    4. Nonrheumatic aortic valve stenosis    5. Overweight    6. Prediabetes  - Hemoglobin A1C; Future    7. Primary osteoarthritis of left " knee      -Continue current medications and maintain follow up with specialists.    -Follow up in about 6 months (around 7/2/2024) for follow up of medical problems.       Fior Jay MD  [facility]                  Answers submitted by the patient for this visit:  Review of Systems Questionnaire (Submitted on 12/29/2023)  activity change: No  unexpected weight change: No  rhinorrhea: No  trouble swallowing: No  visual disturbance: No  chest tightness: No  polyuria: No  difficulty urinating: No  joint swelling: No  arthralgias: No  confusion: No  dysphoric mood: No

## 2024-02-26 ENCOUNTER — TELEPHONE (OUTPATIENT)
Dept: PRIMARY CARE CLINIC | Facility: CLINIC | Age: 79
End: 2024-02-26
Payer: MEDICARE

## 2024-02-26 DIAGNOSIS — C44.722: Primary | ICD-10-CM

## 2024-02-26 NOTE — TELEPHONE ENCOUNTER
----- Message from Sloane Benton sent at 2/26/2024 11:38 AM CST -----  Type: Referral     Who Called: Quiana rios/  Dermatology   Would the patient rather a call back or a response via [facility]? Call   Best Call Back Number: [phone] fax# [phone]  Additional Information:   Quiana requesting a call regarding pt referral request

## 2024-02-28 ENCOUNTER — TELEPHONE (OUTPATIENT)
Dept: PRIMARY CARE CLINIC | Facility: CLINIC | Age: 79
End: 2024-02-28
Payer: MEDICARE

## 2024-03-20 ENCOUNTER — PATIENT MESSAGE (OUTPATIENT)
Dept: PRIMARY CARE CLINIC | Facility: CLINIC | Age: 79
End: 2024-03-20
Payer: MEDICARE

## 2024-03-26 ENCOUNTER — LAB VISIT (OUTPATIENT)
Dept: LAB | Facility: HOSPITAL | Age: 79
End: 2024-03-26
Attending: INTERNAL MEDICINE
Payer: MEDICARE

## 2024-03-26 DIAGNOSIS — I10 ESSENTIAL HYPERTENSION: ICD-10-CM

## 2024-03-26 DIAGNOSIS — E03.9 ACQUIRED HYPOTHYROIDISM: ICD-10-CM

## 2024-03-26 DIAGNOSIS — R73.03 PREDIABETES: ICD-10-CM

## 2024-03-26 LAB
ALBUMIN SERPL BCP-MCNC: 3.8 G/DL (ref 3.5–5.2)
ALP SERPL-CCNC: 85 U/L (ref 55–135)
ALT SERPL W/O P-5'-P-CCNC: 37 U/L (ref 10–44)
ANION GAP SERPL CALC-SCNC: 5 MMOL/L (ref 8–16)
AST SERPL-CCNC: 29 U/L (ref 10–40)
BASOPHILS # BLD AUTO: 0.06 K/UL (ref 0–0.2)
BASOPHILS NFR BLD: 0.9 % (ref 0–1.9)
BILIRUB SERPL-MCNC: 0.5 MG/DL (ref 0.1–1)
BUN SERPL-MCNC: 18 MG/DL (ref 8–23)
CALCIUM SERPL-MCNC: 9.7 MG/DL (ref 8.7–10.5)
CHLORIDE SERPL-SCNC: 106 MMOL/L (ref 95–110)
CHOLEST SERPL-MCNC: 190 MG/DL (ref 120–199)
CHOLEST/HDLC SERPL: 4.6 {RATIO} (ref 2–5)
CO2 SERPL-SCNC: 27 MMOL/L (ref 23–29)
CREAT SERPL-MCNC: 1 MG/DL (ref 0.5–1.4)
DIFFERENTIAL METHOD BLD: ABNORMAL
EOSINOPHIL # BLD AUTO: 0.2 K/UL (ref 0–0.5)
EOSINOPHIL NFR BLD: 2.8 % (ref 0–8)
ERYTHROCYTE [DISTWIDTH] IN BLOOD BY AUTOMATED COUNT: 13.9 % (ref 11.5–14.5)
EST. GFR  (NO RACE VARIABLE): >60 ML/MIN/1.73 M^2
ESTIMATED AVG GLUCOSE: 120 MG/DL (ref 68–131)
GLUCOSE SERPL-MCNC: 105 MG/DL (ref 70–110)
HBA1C MFR BLD: 5.8 % (ref 4–5.6)
HCT VFR BLD AUTO: 37 % (ref 40–54)
HDLC SERPL-MCNC: 41 MG/DL (ref 40–75)
HDLC SERPL: 21.6 % (ref 20–50)
HGB BLD-MCNC: 12.4 G/DL (ref 14–18)
IMM GRANULOCYTES # BLD AUTO: 0.02 K/UL (ref 0–0.04)
IMM GRANULOCYTES NFR BLD AUTO: 0.3 % (ref 0–0.5)
LDLC SERPL CALC-MCNC: 132.2 MG/DL (ref 63–159)
LYMPHOCYTES # BLD AUTO: 2.4 K/UL (ref 1–4.8)
LYMPHOCYTES NFR BLD: 34.5 % (ref 18–48)
MCH RBC QN AUTO: 32 PG (ref 27–31)
MCHC RBC AUTO-ENTMCNC: 33.5 G/DL (ref 32–36)
MCV RBC AUTO: 96 FL (ref 82–98)
MONOCYTES # BLD AUTO: 0.9 K/UL (ref 0.3–1)
MONOCYTES NFR BLD: 12.7 % (ref 4–15)
NEUTROPHILS # BLD AUTO: 3.4 K/UL (ref 1.8–7.7)
NEUTROPHILS NFR BLD: 48.8 % (ref 38–73)
NONHDLC SERPL-MCNC: 149 MG/DL
NRBC BLD-RTO: 0 /100 WBC
PLATELET # BLD AUTO: 202 K/UL (ref 150–450)
PMV BLD AUTO: 10.4 FL (ref 9.2–12.9)
POTASSIUM SERPL-SCNC: 4.6 MMOL/L (ref 3.5–5.1)
PROT SERPL-MCNC: 6.7 G/DL (ref 6–8.4)
RBC # BLD AUTO: 3.87 M/UL (ref 4.6–6.2)
SODIUM SERPL-SCNC: 138 MMOL/L (ref 136–145)
T4 FREE SERPL-MCNC: 1.11 NG/DL (ref 0.71–1.51)
TRIGL SERPL-MCNC: 84 MG/DL (ref 30–150)
TSH SERPL DL<=0.005 MIU/L-ACNC: 4.59 UIU/ML (ref 0.4–4)
WBC # BLD AUTO: 6.86 K/UL (ref 3.9–12.7)

## 2024-03-26 PROCEDURE — 80053 COMPREHEN METABOLIC PANEL: CPT | Performed by: INTERNAL MEDICINE

## 2024-03-26 PROCEDURE — 84439 ASSAY OF FREE THYROXINE: CPT | Performed by: INTERNAL MEDICINE

## 2024-03-26 PROCEDURE — 85025 COMPLETE CBC W/AUTO DIFF WBC: CPT | Performed by: INTERNAL MEDICINE

## 2024-03-26 PROCEDURE — 83036 HEMOGLOBIN GLYCOSYLATED A1C: CPT | Performed by: INTERNAL MEDICINE

## 2024-03-26 PROCEDURE — 84443 ASSAY THYROID STIM HORMONE: CPT | Performed by: INTERNAL MEDICINE

## 2024-03-26 PROCEDURE — 36415 COLL VENOUS BLD VENIPUNCTURE: CPT | Performed by: INTERNAL MEDICINE

## 2024-03-26 PROCEDURE — 80061 LIPID PANEL: CPT | Performed by: INTERNAL MEDICINE

## 2024-06-25 DIAGNOSIS — Z00.00 ENCOUNTER FOR MEDICARE ANNUAL WELLNESS EXAM: ICD-10-CM

## 2024-07-19 ENCOUNTER — OFFICE VISIT (OUTPATIENT)
Dept: INTERNAL MEDICINE | Facility: CLINIC | Age: 79
End: 2024-07-19
Payer: MEDICARE

## 2024-07-19 VITALS
HEIGHT: 70 IN | DIASTOLIC BLOOD PRESSURE: 88 MMHG | OXYGEN SATURATION: 100 % | BODY MASS INDEX: 25.62 KG/M2 | WEIGHT: 179 LBS | SYSTOLIC BLOOD PRESSURE: 156 MMHG | HEART RATE: 65 BPM

## 2024-07-19 DIAGNOSIS — R73.03 PREDIABETES: ICD-10-CM

## 2024-07-19 DIAGNOSIS — E66.3 OVERWEIGHT: ICD-10-CM

## 2024-07-19 DIAGNOSIS — Z00.00 ENCOUNTER FOR MEDICARE ANNUAL WELLNESS EXAM: ICD-10-CM

## 2024-07-19 DIAGNOSIS — D69.2 SENILE PURPURA: ICD-10-CM

## 2024-07-19 DIAGNOSIS — I35.0 NONRHEUMATIC AORTIC VALVE STENOSIS: ICD-10-CM

## 2024-07-19 DIAGNOSIS — Z00.00 ENCOUNTER FOR PREVENTIVE HEALTH EXAMINATION: Primary | ICD-10-CM

## 2024-07-19 DIAGNOSIS — M17.12 PRIMARY OSTEOARTHRITIS OF LEFT KNEE: ICD-10-CM

## 2024-07-19 DIAGNOSIS — F31.9 BIPOLAR I DISORDER: ICD-10-CM

## 2024-07-19 DIAGNOSIS — I10 ESSENTIAL HYPERTENSION: ICD-10-CM

## 2024-07-19 PROCEDURE — 1170F FXNL STATUS ASSESSED: CPT | Mod: HCNC,CPTII,S$GLB, | Performed by: NURSE PRACTITIONER

## 2024-07-19 PROCEDURE — G0439 PPPS, SUBSEQ VISIT: HCPCS | Mod: HCNC,S$GLB,, | Performed by: NURSE PRACTITIONER

## 2024-07-19 PROCEDURE — 3077F SYST BP >= 140 MM HG: CPT | Mod: HCNC,CPTII,S$GLB, | Performed by: NURSE PRACTITIONER

## 2024-07-19 PROCEDURE — 1126F AMNT PAIN NOTED NONE PRSNT: CPT | Mod: HCNC,CPTII,S$GLB, | Performed by: NURSE PRACTITIONER

## 2024-07-19 PROCEDURE — 1158F ADVNC CARE PLAN TLK DOCD: CPT | Mod: HCNC,CPTII,S$GLB, | Performed by: NURSE PRACTITIONER

## 2024-07-19 PROCEDURE — 3288F FALL RISK ASSESSMENT DOCD: CPT | Mod: HCNC,CPTII,S$GLB, | Performed by: NURSE PRACTITIONER

## 2024-07-19 PROCEDURE — 99999 PR PBB SHADOW E&M-EST. PATIENT-LVL V: CPT | Mod: PBBFAC,HCNC,, | Performed by: NURSE PRACTITIONER

## 2024-07-19 PROCEDURE — 1160F RVW MEDS BY RX/DR IN RCRD: CPT | Mod: HCNC,CPTII,S$GLB, | Performed by: NURSE PRACTITIONER

## 2024-07-19 PROCEDURE — 1159F MED LIST DOCD IN RCRD: CPT | Mod: HCNC,CPTII,S$GLB, | Performed by: NURSE PRACTITIONER

## 2024-07-19 PROCEDURE — 1101F PT FALLS ASSESS-DOCD LE1/YR: CPT | Mod: HCNC,CPTII,S$GLB, | Performed by: NURSE PRACTITIONER

## 2024-07-19 PROCEDURE — 3079F DIAST BP 80-89 MM HG: CPT | Mod: HCNC,CPTII,S$GLB, | Performed by: NURSE PRACTITIONER

## 2024-07-19 NOTE — PATIENT INSTRUCTIONS
Counseling and Referral of Other Preventative  (Italic type indicates deductible and co-insurance are waived)    Patient Name: Terrell Albrecht  Today's Date: 7/19/2024    Health Maintenance       Date Due Completion Date    Hepatitis C Screening Never done ---    TETANUS VACCINE Never done ---    Pneumococcal Vaccines (Age 65+) (1 of 1 - PCV) Never done ---    Shingles Vaccine (2 of 3) 09/06/2016 7/12/2016    COVID-19 Vaccine (6 - 2023-24 season) 01/25/2024 9/25/2023    Influenza Vaccine (1) 09/01/2024 9/10/2023    Hemoglobin A1c (Prediabetes) 03/26/2025 3/26/2024    Lipid Panel 03/26/2029 3/26/2024        No orders of the defined types were placed in this encounter.      The following information is provided to all patients.  This information is to help you find resources for any of the problems found today that may be affecting your health:                  Living healthy guide: www.[facility].[place].[facility]      Understanding Diabetes: www.diabetes.org      Eating healthy: www.cdc.gov/healthyweight      CDC home safety checklist: www.cdc.gov/steadi/patient.html      Agency on Aging: www.goea.[place].gov      Alcoholics anonymous (AA): www.aa.org      Physical Activity: www.moises.nih.gov/sm6kfzg      Tobacco use: www.quitwithusla.org

## 2024-07-19 NOTE — PROGRESS NOTES
"  Terrell Albrecht presented for an initial Medicare AWV today. The following components were reviewed and updated:    Medical history  Family History  Social history  Allergies and Current Medications  Health Risk Assessment  Health Maintenance  Care Team    **See Completed Assessments for Annual Wellness visit with in the encounter summary    The following assessments were completed:  Depression Screening  Cognitive function Screening  Timed Get Up Test  Whisper Test      Opioid documentation:      Patient does not have a current opioid prescription.              Vitals:    07/19/24 0836 07/19/24 0916   BP: (!) 144/72 (!) 156/88   Pulse: 65    SpO2: 100%    Weight: 81.2 kg (179 lb 0.2 oz)    Height: 5' 10" (1.778 m)      Body mass index is 25.69 kg/m².       Physical Exam  Constitutional:       Appearance: Normal appearance.   Pulmonary:      Effort: Pulmonary effort is normal.      Breath sounds: Normal breath sounds.   Neurological:      Mental Status: He is alert.           Diagnoses and health risks identified today and associated recommendations/orders:  1. Encounter for preventive health examination  Annual Health Risk Assessment (HRA) visit today.  Counseling and referral of health maintenance and preventative health measures performed.  Patient given annual wellness paperwork to take home.  Encouraged to return in 1 year for subsequent HRA visit.     2. Nonrheumatic aortic valve stenosis  Chronic. Stable. Continue current treatment plan as previously prescribed by PCP.    3. Essential hypertension  Chronic. Stable. Uncontrolled. Encouraged to increase exercise as tolerated (moderate-intensity aerobic activity and muscle-strengthening activities) improve diet to heart healthy, low sodium diet.  Continue current treatment plan as previously prescribed by PCP.    4. Bipolar I disorder  Chronic. Stable. Continue current treatment plan as previously prescribed by PCP.    5. Prediabetes  Chronic. Stable. Last Hgb " A1c=5.8 from 3/26/24. Continue current treatment plan as previously prescribed by PCP.    6. Overweight  Chronic. Stable. Encouraged to increase exercise as tolerated and improve diet to heart healthy, low sodium diet. Continue current treatment plan as previously prescribed by PCP.    7. Primary osteoarthritis of left knee  Chronic. Stable. Continue current treatment plan as previously prescribed by PCP.    8. Encounter for Medicare annual wellness exam  Chronic. Stable. Continue current treatment plan as previously prescribed by PCP.  - Ambulatory Referral/Consult to Enhanced Annual Wellness Visit (eAWV)    9. Senile purpura  Chronic. Stable. Continue current treatment plan as previously prescribed by PCP.      Provided Terrell with a 5-10 year written screening schedule and personal prevention plan. Recommendations were developed using the USPSTF age appropriate recommendations. Education, counseling, and referrals were provided as needed.  After Visit Summary printed and given to patient which includes a list of additional screenings\tests needed.    No follow-ups on file.      Lito Hummel NP

## 2024-08-13 DIAGNOSIS — F31.9 BIPOLAR I DISORDER: ICD-10-CM

## 2024-08-13 RX ORDER — CLONAZEPAM 0.5 MG/1
0.5 TABLET ORAL 4 TIMES DAILY
Qty: 120 TABLET | Refills: 0 | Status: SHIPPED | OUTPATIENT
Start: 2024-08-13

## 2024-08-13 NOTE — TELEPHONE ENCOUNTER
No care due was identified.  [facility] Embedded Care Due Messages. Reference number: [id number].   8/13/2024 7:38:34 AM CDT

## 2024-09-12 DIAGNOSIS — F31.9 BIPOLAR I DISORDER: ICD-10-CM

## 2024-09-12 RX ORDER — CLONAZEPAM 0.5 MG/1
TABLET ORAL
Qty: 120 TABLET | Refills: 0 | Status: SHIPPED | OUTPATIENT
Start: 2024-09-12

## 2024-09-12 NOTE — TELEPHONE ENCOUNTER
No care due was identified.  [facility] Embedded Care Due Messages. Reference number: [id number].   9/12/2024 4:03:15 AM CDT

## 2024-09-25 ENCOUNTER — LAB VISIT (OUTPATIENT)
Dept: LAB | Facility: HOSPITAL | Age: 79
End: 2024-09-25
Attending: STUDENT IN AN ORGANIZED HEALTH CARE EDUCATION/TRAINING PROGRAM
Payer: MEDICARE

## 2024-09-25 ENCOUNTER — OFFICE VISIT (OUTPATIENT)
Dept: PRIMARY CARE CLINIC | Facility: CLINIC | Age: 79
End: 2024-09-25
Payer: MEDICARE

## 2024-09-25 VITALS
WEIGHT: 175.69 LBS | DIASTOLIC BLOOD PRESSURE: 80 MMHG | BODY MASS INDEX: 25.21 KG/M2 | HEART RATE: 69 BPM | OXYGEN SATURATION: 98 % | SYSTOLIC BLOOD PRESSURE: 136 MMHG

## 2024-09-25 DIAGNOSIS — Z00.00 ANNUAL PHYSICAL EXAM: Primary | ICD-10-CM

## 2024-09-25 DIAGNOSIS — R73.03 PREDIABETES: ICD-10-CM

## 2024-09-25 DIAGNOSIS — Z79.899 OTHER LONG TERM (CURRENT) DRUG THERAPY: ICD-10-CM

## 2024-09-25 DIAGNOSIS — I35.0 AORTIC VALVE STENOSIS, ETIOLOGY OF CARDIAC VALVE DISEASE UNSPECIFIED: ICD-10-CM

## 2024-09-25 DIAGNOSIS — I10 ESSENTIAL HYPERTENSION: ICD-10-CM

## 2024-09-25 DIAGNOSIS — E03.9 HYPOTHYROIDISM, UNSPECIFIED TYPE: ICD-10-CM

## 2024-09-25 LAB
ALBUMIN SERPL BCP-MCNC: 4 G/DL (ref 3.5–5.2)
ALP SERPL-CCNC: 84 U/L (ref 55–135)
ALT SERPL W/O P-5'-P-CCNC: 19 U/L (ref 10–44)
ANION GAP SERPL CALC-SCNC: 8 MMOL/L (ref 8–16)
AST SERPL-CCNC: 22 U/L (ref 10–40)
BASOPHILS # BLD AUTO: 0.07 K/UL (ref 0–0.2)
BASOPHILS NFR BLD: 1.1 % (ref 0–1.9)
BILIRUB SERPL-MCNC: 0.7 MG/DL (ref 0.1–1)
BUN SERPL-MCNC: 15 MG/DL (ref 8–23)
CALCIUM SERPL-MCNC: 9.7 MG/DL (ref 8.7–10.5)
CHLORIDE SERPL-SCNC: 105 MMOL/L (ref 95–110)
CO2 SERPL-SCNC: 24 MMOL/L (ref 23–29)
CREAT SERPL-MCNC: 1.2 MG/DL (ref 0.5–1.4)
DIFFERENTIAL METHOD BLD: ABNORMAL
EOSINOPHIL # BLD AUTO: 0.1 K/UL (ref 0–0.5)
EOSINOPHIL NFR BLD: 1.7 % (ref 0–8)
ERYTHROCYTE [DISTWIDTH] IN BLOOD BY AUTOMATED COUNT: 13.7 % (ref 11.5–14.5)
EST. GFR  (NO RACE VARIABLE): >60 ML/MIN/1.73 M^2
ESTIMATED AVG GLUCOSE: 105 MG/DL (ref 68–131)
GLUCOSE SERPL-MCNC: 81 MG/DL (ref 70–110)
HBA1C MFR BLD: 5.3 % (ref 4–5.6)
HCT VFR BLD AUTO: 39.3 % (ref 40–54)
HGB BLD-MCNC: 12.8 G/DL (ref 14–18)
IMM GRANULOCYTES # BLD AUTO: 0.01 K/UL (ref 0–0.04)
IMM GRANULOCYTES NFR BLD AUTO: 0.2 % (ref 0–0.5)
LYMPHOCYTES # BLD AUTO: 2.3 K/UL (ref 1–4.8)
LYMPHOCYTES NFR BLD: 35.8 % (ref 18–48)
MCH RBC QN AUTO: 31.8 PG (ref 27–31)
MCHC RBC AUTO-ENTMCNC: 32.6 G/DL (ref 32–36)
MCV RBC AUTO: 98 FL (ref 82–98)
MONOCYTES # BLD AUTO: 0.8 K/UL (ref 0.3–1)
MONOCYTES NFR BLD: 11.9 % (ref 4–15)
NEUTROPHILS # BLD AUTO: 3.2 K/UL (ref 1.8–7.7)
NEUTROPHILS NFR BLD: 49.3 % (ref 38–73)
NRBC BLD-RTO: 0 /100 WBC
PLATELET # BLD AUTO: 213 K/UL (ref 150–450)
PMV BLD AUTO: 11.2 FL (ref 9.2–12.9)
POTASSIUM SERPL-SCNC: 4.5 MMOL/L (ref 3.5–5.1)
PROT SERPL-MCNC: 7 G/DL (ref 6–8.4)
RBC # BLD AUTO: 4.03 M/UL (ref 4.6–6.2)
SODIUM SERPL-SCNC: 137 MMOL/L (ref 136–145)
TSH SERPL DL<=0.005 MIU/L-ACNC: 3.19 UIU/ML (ref 0.4–4)
WBC # BLD AUTO: 6.45 K/UL (ref 3.9–12.7)

## 2024-09-25 PROCEDURE — 1159F MED LIST DOCD IN RCRD: CPT | Mod: CPTII,S$GLB,, | Performed by: STUDENT IN AN ORGANIZED HEALTH CARE EDUCATION/TRAINING PROGRAM

## 2024-09-25 PROCEDURE — 84443 ASSAY THYROID STIM HORMONE: CPT | Mod: HCNC | Performed by: STUDENT IN AN ORGANIZED HEALTH CARE EDUCATION/TRAINING PROGRAM

## 2024-09-25 PROCEDURE — 36415 COLL VENOUS BLD VENIPUNCTURE: CPT | Performed by: STUDENT IN AN ORGANIZED HEALTH CARE EDUCATION/TRAINING PROGRAM

## 2024-09-25 PROCEDURE — 99214 OFFICE O/P EST MOD 30 MIN: CPT | Mod: S$GLB,,, | Performed by: STUDENT IN AN ORGANIZED HEALTH CARE EDUCATION/TRAINING PROGRAM

## 2024-09-25 PROCEDURE — 85025 COMPLETE CBC W/AUTO DIFF WBC: CPT | Mod: HCNC | Performed by: STUDENT IN AN ORGANIZED HEALTH CARE EDUCATION/TRAINING PROGRAM

## 2024-09-25 PROCEDURE — 83036 HEMOGLOBIN GLYCOSYLATED A1C: CPT | Mod: HCNC | Performed by: STUDENT IN AN ORGANIZED HEALTH CARE EDUCATION/TRAINING PROGRAM

## 2024-09-25 PROCEDURE — 1160F RVW MEDS BY RX/DR IN RCRD: CPT | Mod: CPTII,S$GLB,, | Performed by: STUDENT IN AN ORGANIZED HEALTH CARE EDUCATION/TRAINING PROGRAM

## 2024-09-25 PROCEDURE — 1126F AMNT PAIN NOTED NONE PRSNT: CPT | Mod: CPTII,S$GLB,, | Performed by: STUDENT IN AN ORGANIZED HEALTH CARE EDUCATION/TRAINING PROGRAM

## 2024-09-25 PROCEDURE — 80053 COMPREHEN METABOLIC PANEL: CPT | Mod: HCNC | Performed by: STUDENT IN AN ORGANIZED HEALTH CARE EDUCATION/TRAINING PROGRAM

## 2024-09-25 PROCEDURE — 3075F SYST BP GE 130 - 139MM HG: CPT | Mod: CPTII,S$GLB,, | Performed by: STUDENT IN AN ORGANIZED HEALTH CARE EDUCATION/TRAINING PROGRAM

## 2024-09-25 PROCEDURE — 99999 PR PBB SHADOW E&M-EST. PATIENT-LVL IV: CPT | Mod: PBBFAC,,, | Performed by: STUDENT IN AN ORGANIZED HEALTH CARE EDUCATION/TRAINING PROGRAM

## 2024-09-25 PROCEDURE — 3079F DIAST BP 80-89 MM HG: CPT | Mod: CPTII,S$GLB,, | Performed by: STUDENT IN AN ORGANIZED HEALTH CARE EDUCATION/TRAINING PROGRAM

## 2024-09-25 NOTE — PROGRESS NOTES
SUBJECTIVE     Chief Complaint   Patient presents with    Annual Exam       HPI  Terrell Albrecht is a 79 y.o. male with medical diagnoses as listed in the medical history and problem list that presents for annual exam.     PCP: Dr. Fior Jay    Pt is UTD on age appropriate CA screening.    Family, social, surgical Hx reviewed     Bipolar I Disorder:  Wellbutrin 300 mg daily  Lamictal 200 mg daily    HTN:   Losartan 100 mg daily, tolerating without issues  Hx of aortic valve stenosis, prev following with cariology at , request [facility] referral     Hypothyroidism:  Synthroid 88 mcg daily, tolerating without issues    Prediabetes: diet controlled      Health Maintenance         Date Due Completion Date    Hepatitis C Screening Never done ---    Shingles Vaccine (2 of 3) 09/06/2016 7/12/2016    COVID-19 Vaccine (6 - 2023-24 season) 09/01/2024 9/25/2023    Hemoglobin A1c (Prediabetes) 03/26/2025 3/26/2024    Pneumococcal Vaccines (Age 65+) (2 of 2 - PCV) 08/25/2025 8/25/2024    Lipid Panel 03/26/2029 3/26/2024    TETANUS VACCINE 08/25/2034 8/25/2024              PAST MEDICAL HISTORY:  History reviewed. No pertinent past medical history.    PAST SURGICAL HISTORY:  Past Surgical History:   Procedure Laterality Date    INSERTION OF PENILE IMPLANT BY INFRAPUBIC APPROACH      ROTATOR CUFF REPAIR Right        SOCIAL HISTORY:  Social History     Socioeconomic History    Marital status:    Tobacco Use    Smoking status: Never   Substance and Sexual Activity    Alcohol use: Not Currently     Comment: Sober Active Member of [employer] for 43 years    Drug use: Never    Sexual activity: Yes     Partners: Female     Social Determinants of Health     Financial Resource Strain: Low Risk  (7/19/2024)    Overall Financial Resource Strain (CARDIA)     Difficulty of Paying Living Expenses: Not very hard   Food Insecurity: No Food Insecurity (7/19/2024)    Hunger Vital Sign     Worried About Running Out of Food in the Last Year: Never  true     Ran Out of Food in the Last Year: Never true   Transportation Needs: No Transportation Needs (7/19/2024)    PRAPARE - Transportation     Lack of Transportation (Medical): No     Lack of Transportation (Non-Medical): No   Physical Activity: Sufficiently Active (7/19/2024)    Exercise Vital Sign     Days of Exercise per Week: 5 days     Minutes of Exercise per Session: 50 min   Stress: No Stress Concern Present (7/19/2024)    [employer] - Occupational Stress Questionnaire     Feeling of Stress : Not at all   Housing Stability: Low Risk  (7/19/2024)    Housing Stability Vital Sign     Unable to Pay for Housing in the Last Year: No     Homeless in the Last Year: No   Recent Concern: Housing Stability - High Risk (7/16/2024)    Housing Stability Vital Sign     Unable to Pay for Housing in the Last Year: Yes       FAMILY HISTORY:  Family History   Problem Relation Name Age of Onset    Arthritis Mother Duyen Albrecht     Cancer Mother Duyen Albrecht     Heart disease Mother Duyen Albrecht     Hypertension Mother Duyen Albrecht     Depression Brother Tanner Albrecht     Diabetes Brother Tanner Albrecht     Depression Sister Madison Albrecht        ALLERGIES AND MEDICATIONS: updated and reviewed.  Review of patient's allergies indicates:   Allergen Reactions    Sulfa (sulfonamide antibiotics)      Current Outpatient Medications   Medication Sig Dispense Refill    buPROPion (WELLBUTRIN XL) 300 MG 24 hr tablet Take 1 tablet (300 mg total) by mouth once daily. 90 tablet 3    clonazePAM (KLONOPIN) 0.5 MG tablet TAKE 1 TABLET (0.5 MG TOTAL) BY MOUTH 4 TIMES A  tablet 0    lamoTRIgine (LAMICTAL) 200 MG tablet Take 1 tablet (200 mg total) by mouth once daily. 90 tablet 3    levothyroxine (SYNTHROID) 88 MCG tablet Take 1 tablet (88 mcg total) by mouth before breakfast. 90 tablet 3    losartan (COZAAR) 100 MG tablet Take 1 tablet (100 mg total) by mouth  once daily. 90 tablet 3     No current facility-administered medications for this visit.       ROS  Review of Systems   HENT:  Negative for hearing loss.    Eyes:  Negative for discharge.   Respiratory:  Negative for wheezing.    Cardiovascular:  Negative for chest pain and palpitations.   Gastrointestinal:  Negative for blood in stool, constipation, diarrhea and vomiting.   Genitourinary:  Negative for hematuria and urgency.   Musculoskeletal:  Negative for neck pain.   Neurological:  Negative for weakness and headaches.           OBJECTIVE     Physical Exam  Vitals:    09/25/24 0956   BP: 136/80   Pulse:     Body mass index is 25.21 kg/m².  Weight: 79.7 kg (175 lb 11.3 oz)         Physical Exam  HENT:      Head: Normocephalic and atraumatic.      Nose: Nose normal.      Mouth/Throat:      Mouth: Mucous membranes are moist.      Pharynx: Oropharynx is clear.   Eyes:      Extraocular Movements: Extraocular movements intact.      Conjunctiva/sclera: Conjunctivae normal.      Pupils: Pupils are equal, round, and reactive to light.   Cardiovascular:      Rate and Rhythm: Normal rate and regular rhythm.   Pulmonary:      Effort: Pulmonary effort is normal.      Breath sounds: Normal breath sounds.   Musculoskeletal:         General: No swelling. Normal range of motion.      Cervical back: Normal range of motion.      Right lower leg: No edema.      Left lower leg: No edema.   Skin:     General: Skin is warm.      Findings: No lesion or rash.   Neurological:      General: No focal deficit present.      Mental Status: He is alert and oriented to person, place, and time.      Motor: No weakness.               ASSESSMENT     79 y.o. male with     1. Annual physical exam    2. Essential hypertension    3. Prediabetes    4. Other long term (current) drug therapy    5. Hypothyroidism, unspecified type    6. Aortic valve stenosis, etiology of cardiac valve disease unspecified        PLAN:     1. Annual physical exam    2.  Essential hypertension  -     Ambulatory referral/consult to Cardiology; Future; Expected date: 10/02/2024  Stable on medications, continue regimen    3. Prediabetes  -     Hemoglobin A1C; Future; Expected date: 09/25/2024  Diet controlled    4. Other long term (current) drug therapy  -     Hemoglobin A1C; Future; Expected date: 09/25/2024  -     TSH; Future; Expected date: 09/25/2024  -     Comprehensive Metabolic Panel; Future; Expected date: 09/25/2024  -     CBC Auto Differential; Future; Expected date: 09/25/2024    5. Hypothyroidism, unspecified type  Stable on medications, continue regimen    6. Aortic valve stenosis, etiology of cardiac valve disease unspecified  -     Ambulatory referral/consult to Cardiology; Future; Expected date: 10/02/2024                Discussed age and gender appropriate screenings at this visit and encouraged a healthy diet low in simple carbohydrates, and increased physical activity.  Counseled on medically appropriate vaccines based on age and current health condition.  Screening test reviewed and discussed with patient.      RTC in 6 months    Prisca Rowe MD  09/25/2024 9:42 AM

## 2024-10-11 DIAGNOSIS — F31.9 BIPOLAR I DISORDER: ICD-10-CM

## 2024-10-11 RX ORDER — CLONAZEPAM 0.5 MG/1
0.5 TABLET ORAL 4 TIMES DAILY PRN
Qty: 120 TABLET | Refills: 5 | Status: SHIPPED | OUTPATIENT
Start: 2024-10-11

## 2024-10-11 NOTE — TELEPHONE ENCOUNTER
No care due was identified.  [facility] Embedded Care Due Messages. Reference number: [id number].   10/11/2024 9:44:11 AM CDT

## 2024-10-24 ENCOUNTER — OFFICE VISIT (OUTPATIENT)
Dept: CARDIOLOGY | Facility: CLINIC | Age: 79
End: 2024-10-24
Payer: MEDICARE

## 2024-10-24 VITALS
SYSTOLIC BLOOD PRESSURE: 120 MMHG | BODY MASS INDEX: 25.91 KG/M2 | DIASTOLIC BLOOD PRESSURE: 70 MMHG | WEIGHT: 180.56 LBS | HEART RATE: 64 BPM

## 2024-10-24 DIAGNOSIS — I10 ESSENTIAL HYPERTENSION: ICD-10-CM

## 2024-10-24 DIAGNOSIS — I35.0 AORTIC VALVE STENOSIS, ETIOLOGY OF CARDIAC VALVE DISEASE UNSPECIFIED: Primary | ICD-10-CM

## 2024-10-24 DIAGNOSIS — Z13.6 ENCOUNTER FOR SCREENING FOR CARDIOVASCULAR DISORDERS: ICD-10-CM

## 2024-10-24 DIAGNOSIS — R09.89 BILATERAL CAROTID BRUITS: ICD-10-CM

## 2024-10-24 PROCEDURE — 1101F PT FALLS ASSESS-DOCD LE1/YR: CPT | Mod: CPTII,S$GLB,, | Performed by: INTERNAL MEDICINE

## 2024-10-24 PROCEDURE — 3078F DIAST BP <80 MM HG: CPT | Mod: CPTII,S$GLB,, | Performed by: INTERNAL MEDICINE

## 2024-10-24 PROCEDURE — 93000 ELECTROCARDIOGRAM COMPLETE: CPT | Mod: S$GLB,,, | Performed by: INTERNAL MEDICINE

## 2024-10-24 PROCEDURE — 1159F MED LIST DOCD IN RCRD: CPT | Mod: CPTII,S$GLB,, | Performed by: INTERNAL MEDICINE

## 2024-10-24 PROCEDURE — 3074F SYST BP LT 130 MM HG: CPT | Mod: CPTII,S$GLB,, | Performed by: INTERNAL MEDICINE

## 2024-10-24 PROCEDURE — 99214 OFFICE O/P EST MOD 30 MIN: CPT | Mod: 25,S$GLB,, | Performed by: INTERNAL MEDICINE

## 2024-10-24 PROCEDURE — 3288F FALL RISK ASSESSMENT DOCD: CPT | Mod: CPTII,S$GLB,, | Performed by: INTERNAL MEDICINE

## 2024-10-24 PROCEDURE — 99999 PR PBB SHADOW E&M-EST. PATIENT-LVL III: CPT | Mod: PBBFAC,,, | Performed by: INTERNAL MEDICINE

## 2024-10-24 PROCEDURE — 1126F AMNT PAIN NOTED NONE PRSNT: CPT | Mod: CPTII,S$GLB,, | Performed by: INTERNAL MEDICINE

## 2024-10-24 NOTE — PROGRESS NOTES
Subjective:   10/24/2024     Patient ID:  Terrell Albrecht is a 79 y.o. male who presents for evaulation of Hypertension      Comes in for follow-up.  He is known to have mild aortic valve stenosis on prior echocardiography a year ago.  He is not having chest pains or tightness, PND or orthopnea.    Exam today does show bilateral carotid bruits.  He has no unilateral weakness numbness or tingling.      He does have hypertension, currently takes losartan 100 mg daily.      The 10-year ASCVD risk score (Mariah PLUNKETT, et al., 2019) is: 44.1%    Values used to calculate the score:      Age: 79 years      Sex: Male      Is Non- : No      Diabetic: No      Tobacco smoker: No      Systolic Blood Pressure: 152 mmHg      Is BP treated: Yes      HDL Cholesterol: 41 mg/dL      Total Cholesterol: 190 mg/dL      Prior cardiology note reviewed:  Terrell Albrecht is a 78 y.o. male with a PMHx of mild-to-moderate aortic stenosis, hypertension, hypothyroidism, hyperlipidemia, and prediabetes who presents to clinic to establish care. Patient was referred to clinic by PCP due to systolic murmur and aortic valve stenosis. Doing well denied any chest pain, shortness of breath, palpitations, orthopnea or PND. 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 His wife has Parkinson disease and she participates in Parkinson boxing classes. Never had any cardiac conditions before. Had an echocardiogram that showed mild-to-moderate aortic stenosis    Past medical history as above  Family history mother with heart issues but she  in her 90s  Social history denied any tobacco use, alcohol or drug abuse.  Impression  The left ventricle is normal in size.  There is mild to moderate concentric left ventricular hypertrophy.  Ejection Fraction = 60-65%.  There is no left ventricular diastolic dysfunction.  Mild to moderate valvular  aortic stenosis.  Trace to mild aortic regurgitation.  There is trace mitral regurgitation.  There is mild tricuspid regurgitation.  Peak PA systolic pressure is normal  ____________________________________________  The aortic valve is trileaflet. Mild to moderate valvular aortic stenosis. The calculated aortic  valve area using the continuity equation is 1.3 cm2. The peak aortic valve gradient 33 mmHg. The  mean aortic valve gradient 20 mmHg. Trace to mild aortic regurgitation.    Plan:  # mild-to-moderate aortic valve stenosis, asymptomatic. Will continue to monitor and repeat echocardiogram in 1 year    # hypertension, continue losartan 100 mg daily. Blood pressure in clinic today 160/90, it was normal when he was seen by Dr. Birch, reported being anxious, on recheck 148/78, will continue to monitor    # hyperlipidemia, not on any medication. Lipid panel on 08/05/2023 showed Cholesterol 208, HDL 52, , and Triglycerides 87. Will start him on Lipitor 20 mg daily          No past medical history on file.    Review of patient's allergies indicates:   Allergen Reactions    Sulfa (sulfonamide antibiotics)          Current Outpatient Medications:     buPROPion (WELLBUTRIN XL) 300 MG 24 hr tablet, Take 1 tablet (300 mg total) by mouth once daily., Disp: 90 tablet, Rfl: 3    clonazePAM (KLONOPIN) 0.5 MG tablet, Take 1 tablet (0.5 mg total) by mouth 4 (four) times daily as needed for Anxiety., Disp: 120 tablet, Rfl: 5    lamoTRIgine (LAMICTAL) 200 MG tablet, Take 1 tablet (200 mg total) by mouth once daily., Disp: 90 tablet, Rfl: 3    levothyroxine (SYNTHROID) 88 MCG tablet, Take 1 tablet (88 mcg total) by mouth before breakfast., Disp: 90 tablet, Rfl: 3    losartan (COZAAR) 100 MG tablet, Take 1 tablet (100 mg total) by mouth once daily., Disp: 90 tablet, Rfl: 3     Objective:   Review of Systems   Cardiovascular:  Negative for chest pain, claudication, cyanosis, dyspnea on exertion, irregular heartbeat, leg  swelling, near-syncope, orthopnea, palpitations, paroxysmal nocturnal dyspnea and syncope.         Vitals:    10/24/24 0819   BP: 120/70   Pulse: 64     Wt Readings from Last 3 Encounters:   10/24/24 81.9 kg (180 lb 8.9 oz)   09/25/24 79.7 kg (175 lb 11.3 oz)   07/19/24 81.2 kg (179 lb 0.2 oz)     Temp Readings from Last 3 Encounters:   11/02/22 98.9 °F (37.2 °C)   01/03/20 98.4 °F (36.9 °C)     BP Readings from Last 3 Encounters:   10/24/24 120/70   09/25/24 136/80   07/19/24 (!) 156/88     Pulse Readings from Last 3 Encounters:   10/24/24 64   09/25/24 69   07/19/24 65             Physical Exam  Vitals reviewed.   Constitutional:       General: He is not in acute distress.     Appearance: He is well-developed.   HENT:      Head: Normocephalic and atraumatic.      Nose: Nose normal.   Eyes:      Conjunctiva/sclera: Conjunctivae normal.      Pupils: Pupils are equal, round, and reactive to light.   Neck:      Vascular: Carotid bruit present. No JVD.   Cardiovascular:      Rate and Rhythm: Normal rate and regular rhythm.      Pulses: Normal pulses and intact distal pulses.      Heart sounds: Murmur (Grade 2 systolic ejection murmur) heard.      No friction rub. No gallop.   Pulmonary:      Effort: Pulmonary effort is normal. No respiratory distress.      Breath sounds: Normal breath sounds. No wheezing or rales.   Chest:      Chest wall: No tenderness.   Abdominal:      General: Bowel sounds are normal. There is no distension.      Palpations: Abdomen is soft.      Tenderness: There is no abdominal tenderness.   Musculoskeletal:         General: No tenderness or deformity. Normal range of motion.      Cervical back: Normal range of motion and neck supple.      Right lower leg: No edema.      Left lower leg: No edema.   Skin:     General: Skin is warm and dry.      Findings: No erythema or rash.   Neurological:      Mental Status: He is alert and oriented to person, place, and time.      Cranial Nerves: No cranial  nerve deficit.      Motor: No abnormal muscle tone.      Coordination: Coordination normal.   Psychiatric:         Behavior: Behavior normal.         Thought Content: Thought content normal.         Judgment: Judgment normal.         Lab Results   Component Value Date    CHOL 190 03/26/2024    CHOL 167 08/17/2022     Lab Results   Component Value Date    HDL 41 03/26/2024    HDL 38 (L) 08/17/2022     Lab Results   Component Value Date    LDLCALC 132.2 03/26/2024    LDLCALC 113 08/17/2022     Lab Results   Component Value Date    ALT 19 09/25/2024    AST 22 09/25/2024    AST 29 03/26/2024    AST 27 08/05/2023     Lab Results   Component Value Date    CREATININE 1.2 09/25/2024    BUN 15 09/25/2024     09/25/2024    K 4.5 09/25/2024    CO2 24 09/25/2024    CO2 27 03/26/2024    CO2 28 08/05/2023     Lab Results   Component Value Date    HGB 12.8 (L) 09/25/2024    HCT 39.3 (L) 09/25/2024    HCT 37.0 (L) 03/26/2024                 EKG today shows normal sinus rhythm, normal electrocardiogram        Assessment and Plan:     Aortic valve stenosis, etiology of cardiac valve disease unspecified  Comments:  Asymptomatic   Repeat echo  Orders:  -     Ambulatory referral/consult to Cardiology  -     IN OFFICE EKG 12-LEAD (to Muse)  -     Echo; Future; Expected date: 10/31/2024    Essential hypertension  Comments:  Blood pressure always well controlled at home, he will continue follow.  Would add amlodipine 2.5 q.h.s. if elevated  Orders:  -     Ambulatory referral/consult to Cardiology  -     IN OFFICE EKG 12-LEAD (to Muse)    Encounter for screening for cardiovascular disorders  -     CT Cardiac Scoring; Future; Expected date: 10/24/2024    Bilateral carotid bruits  Comments:  Carotid ultrasounds to be obtained  Orders:  -     CV Ultrasound Bilateral Doppler Carotid; Future; Expected date: 10/25/2024         Follow up in about 15 months (around 1/13/2026).          Future Appointments   Date Time Provider Department  [place]   3/25/2025  9:30 AM Fior Jay MD [facility] ROB [facility]her

## 2024-10-25 LAB
OHS QRS DURATION: 94 MS
OHS QTC CALCULATION: 385 MS

## 2024-10-29 ENCOUNTER — HOSPITAL ENCOUNTER (OUTPATIENT)
Dept: RADIOLOGY | Facility: HOSPITAL | Age: 79
Discharge: HOME | End: 2024-10-29
Attending: INTERNAL MEDICINE
Payer: MEDICARE

## 2024-10-29 ENCOUNTER — TELEPHONE (OUTPATIENT)
Dept: CARDIOLOGY | Facility: CLINIC | Age: 79
End: 2024-10-29
Payer: MEDICARE

## 2024-10-29 DIAGNOSIS — Z13.6 ENCOUNTER FOR SCREENING FOR CARDIOVASCULAR DISORDERS: ICD-10-CM

## 2024-10-29 DIAGNOSIS — E78.00 PURE HYPERCHOLESTEROLEMIA: Primary | ICD-10-CM

## 2024-10-29 PROCEDURE — 75571 CT HRT W/O DYE W/CA TEST: CPT | Mod: TC

## 2024-10-29 PROCEDURE — 75571 CT HRT W/O DYE W/CA TEST: CPT | Mod: 26,,, | Performed by: RADIOLOGY

## 2024-10-29 RX ORDER — ROSUVASTATIN CALCIUM 20 MG/1
20 TABLET, COATED ORAL NIGHTLY
Qty: 90 TABLET | Refills: 3 | Status: SHIPPED | OUTPATIENT
Start: 2024-10-29 | End: 2025-10-29

## 2024-11-04 ENCOUNTER — HOSPITAL ENCOUNTER (OUTPATIENT)
Dept: CARDIOLOGY | Facility: HOSPITAL | Age: 79
Discharge: HOME | End: 2024-11-04
Attending: INTERNAL MEDICINE
Payer: MEDICARE

## 2024-11-04 VITALS
WEIGHT: 180 LBS | SYSTOLIC BLOOD PRESSURE: 106 MMHG | HEART RATE: 58 BPM | HEIGHT: 70 IN | DIASTOLIC BLOOD PRESSURE: 80 MMHG | BODY MASS INDEX: 25.77 KG/M2

## 2024-11-04 DIAGNOSIS — R09.89 BILATERAL CAROTID BRUITS: ICD-10-CM

## 2024-11-04 DIAGNOSIS — I35.0 AORTIC VALVE STENOSIS, ETIOLOGY OF CARDIAC VALVE DISEASE UNSPECIFIED: ICD-10-CM

## 2024-11-04 PROBLEM — I25.10 CORONARY ARTERY DISEASE INVOLVING NATIVE CORONARY ARTERY OF NATIVE HEART WITHOUT ANGINA PECTORIS: Status: ACTIVE | Noted: 2024-11-04

## 2024-11-04 PROBLEM — I77.810 ASCENDING AORTA DILATION: Status: ACTIVE | Noted: 2024-11-04

## 2024-11-04 LAB
ASCENDING AORTA: 4.6 CM
AV AREA BY CONTINUOUS VTI: 1.3 CM2
AV INDEX (PROSTH): 0.41
AV LVOT MEAN GRADIENT: 4 MMHG
AV LVOT PEAK GRADIENT: 6 MMHG
AV MEAN GRADIENT: 22.6 MMHG
AV PEAK GRADIENT: 38.4 MMHG
AV REGURGITATION PRESSURE HALF TIME: 609.27 MS
AV VALVE AREA BY VELOCITY RATIO: 1.3 CM²
AV VALVE AREA: 1.3 CM2
AV VELOCITY RATIO: 0.42
BSA FOR ECHO PROCEDURE: 2.01 M2
CV ECHO LV RWT: 0.33 CM
DOP CALC AO PEAK VEL: 3.1 M/S
DOP CALC AO VTI: 79.3 CM
DOP CALC LVOT AREA: 3.1 CM2
DOP CALC LVOT DIAMETER: 2 CM
DOP CALC LVOT PEAK VEL: 1.3 M/S
DOP CALC LVOT STROKE VOLUME: 101.4 CM3
DOP CALC RVOT AREA: 4.19 CM2
DOP CALC RVOT DIAMETER: 2.31 CM
DOP CALCLVOT PEAK VEL VTI: 32.3 CM
E WAVE DECELERATION TIME: 258.66 MS
E/A RATIO: 0.86
E/E' RATIO: 6.36 M/S
ECHO EF ESTIMATED: 61 %
ECHO LV POSTERIOR WALL: 0.8 CM (ref 0.6–1.1)
FRACTIONAL SHORTENING: 33.3 % (ref 28–44)
HR MV ECHO: 58 BPM
INTERVENTRICULAR SEPTUM: 0.8 CM (ref 0.6–1.1)
IVC DIAMETER: 1.87 CM
LA MAJOR: 4.4 CM
LA MINOR: 4.98 CM
LA WIDTH: 3.28 CM
LEFT ARM DIASTOLIC BLOOD PRESSURE: 70 MMHG
LEFT ARM SYSTOLIC BLOOD PRESSURE: 160 MMHG
LEFT ATRIUM SIZE: 3.37 CM
LEFT ATRIUM VOLUME INDEX MOD: 19 ML/M2
LEFT ATRIUM VOLUME INDEX: 21.9 ML/M2
LEFT ATRIUM VOLUME MOD: 38.01 ML
LEFT ATRIUM VOLUME: 43.9 CM3
LEFT CBA DIAS: 18 CM/S
LEFT CBA SYS: 86 CM/S
LEFT CCA DIST DIAS: 18 CM/S
LEFT CCA DIST SYS: 80 CM/S
LEFT CCA MID DIAS: 22 CM/S
LEFT CCA MID SYS: 97 CM/S
LEFT CCA PROX DIAS: 14 CM/S
LEFT CCA PROX SYS: 101 CM/S
LEFT ECA DIAS: 17 CM/S
LEFT ECA SYS: 94 CM/S
LEFT ICA DIST DIAS: 12 CM/S
LEFT ICA DIST SYS: 39 CM/S
LEFT ICA MID DIAS: 22 CM/S
LEFT ICA MID SYS: 67 CM/S
LEFT ICA PROX DIAS: 26 CM/S
LEFT ICA PROX SYS: 103 CM/S
LEFT INTERNAL DIMENSION IN SYSTOLE: 3.2 CM (ref 2.1–4)
LEFT VENTRICLE DIASTOLIC VOLUME INDEX: 53.56 ML/M2
LEFT VENTRICLE DIASTOLIC VOLUME: 107.11 ML
LEFT VENTRICLE MASS INDEX: 63.3 G/M2
LEFT VENTRICLE SYSTOLIC VOLUME INDEX: 21 ML/M2
LEFT VENTRICLE SYSTOLIC VOLUME: 41.98 ML
LEFT VENTRICULAR INTERNAL DIMENSION IN DIASTOLE: 4.8 CM (ref 3.5–6)
LEFT VENTRICULAR MASS: 126.7 G
LEFT VERTEBRAL DIAS: 11 CM/S
LEFT VERTEBRAL SYS: 37 CM/S
LV LATERAL E/E' RATIO: 5.38
LV SEPTAL E/E' RATIO: 7.78
MV A" WAVE DURATION": 137.01 MS
MV PEAK A VEL: 0.81 M/S
MV PEAK E VEL: 0.7 M/S
MV PEAK GRADIENT: 1 MMHG
OHS CV CAROTID RIGHT ICA EDV HIGHEST: 25
OHS CV CAROTID ULTRASOUND LEFT ICA/CCA RATIO: 1.29
OHS CV CAROTID ULTRASOUND RIGHT ICA/CCA RATIO: 1.06
OHS CV PV CAROTID LEFT HIGHEST CCA: 101
OHS CV PV CAROTID LEFT HIGHEST ICA: 103
OHS CV PV CAROTID RIGHT HIGHEST CCA: 99
OHS CV PV CAROTID RIGHT HIGHEST ICA: 87
OHS CV RV/LV RATIO: 0.63 CM
OHS CV US CAROTID LEFT HIGHEST EDV: 26
PISA TR MAX VEL: 2.52 M/S
PULM VEIN A" WAVE DURATION": 137.01 MS
PULM VEIN S/D RATIO: 1.06
PULMONIC VEIN PEAK A VELOCITY: 0.3 M/S
PV PEAK D VEL: 0.31 M/S
PV PEAK S VEL: 0.33 M/S
RA MAJOR: 4.44 CM
RA PRESSURE ESTIMATED: 3 MMHG
RA WIDTH: 3.2 CM
RIGHT ARM DIASTOLIC BLOOD PRESSURE: 80 MMHG
RIGHT ARM SYSTOLIC BLOOD PRESSURE: 160 MMHG
RIGHT ATRIAL AREA: 13.4 CM2
RIGHT CBA DIAS: 18 CM/S
RIGHT CBA SYS: 76 CM/S
RIGHT CCA DIST DIAS: 18 CM/S
RIGHT CCA DIST SYS: 82 CM/S
RIGHT CCA MID DIAS: 20 CM/S
RIGHT CCA MID SYS: 99 CM/S
RIGHT CCA PROX DIAS: 10 CM/S
RIGHT CCA PROX SYS: 80 CM/S
RIGHT ECA DIAS: 11 CM/S
RIGHT ECA SYS: 76 CM/S
RIGHT ICA DIST DIAS: 22 CM/S
RIGHT ICA DIST SYS: 69 CM/S
RIGHT ICA MID DIAS: 25 CM/S
RIGHT ICA MID SYS: 87 CM/S
RIGHT ICA PROX DIAS: 17 CM/S
RIGHT ICA PROX SYS: 78 CM/S
RIGHT VENTRICLE DIASTOLIC BASEL DIMENSION: 3 CM
RIGHT VERTEBRAL DIAS: 9 CM/S
RIGHT VERTEBRAL SYS: 35 CM/S
RV TB RVSP: 6 MMHG
RV TISSUE DOPPLER FREE WALL SYSTOLIC VELOCITY 1 (APICAL 4 CHAMBER VIEW): 14.39 CM/S
SINUS: 3.44 CM
STJ: 3.37 CM
TDI LATERAL: 0.13 M/S
TDI SEPTAL: 0.09 M/S
TDI: 0.11 M/S
TR MAX PG: 25 MMHG
TRICUSPID ANNULAR PLANE SYSTOLIC EXCURSION: 1.92 CM
TV PEAK GRADIENT: 25 MMHG
TV REST PULMONARY ARTERY PRESSURE: 28 MMHG
Z-SCORE OF LEFT VENTRICULAR DIMENSION IN END DIASTOLE: -1.94
Z-SCORE OF LEFT VENTRICULAR DIMENSION IN END SYSTOLE: -0.88

## 2024-11-04 PROCEDURE — 93880 EXTRACRANIAL BILAT STUDY: CPT

## 2024-11-04 PROCEDURE — 93880 EXTRACRANIAL BILAT STUDY: CPT | Mod: 26,,, | Performed by: INTERNAL MEDICINE

## 2024-11-04 PROCEDURE — 93306 TTE W/DOPPLER COMPLETE: CPT | Mod: 26,,, | Performed by: INTERNAL MEDICINE

## 2024-11-04 PROCEDURE — 93306 TTE W/DOPPLER COMPLETE: CPT

## 2024-12-02 ENCOUNTER — LAB VISIT (OUTPATIENT)
Dept: LAB | Facility: HOSPITAL | Age: 79
End: 2024-12-02
Attending: INTERNAL MEDICINE
Payer: MEDICARE

## 2024-12-02 DIAGNOSIS — E78.00 PURE HYPERCHOLESTEROLEMIA: ICD-10-CM

## 2024-12-02 LAB
CHOLEST SERPL-MCNC: 129 MG/DL (ref 120–199)
CHOLEST/HDLC SERPL: 2.4 {RATIO} (ref 2–5)
HDLC SERPL-MCNC: 54 MG/DL (ref 40–75)
HDLC SERPL: 41.9 % (ref 20–50)
LDLC SERPL CALC-MCNC: 62.6 MG/DL (ref 63–159)
NONHDLC SERPL-MCNC: 75 MG/DL
TRIGL SERPL-MCNC: 62 MG/DL (ref 30–150)

## 2024-12-02 PROCEDURE — 80061 LIPID PANEL: CPT | Mod: HCNC | Performed by: INTERNAL MEDICINE

## 2024-12-02 PROCEDURE — 83695 ASSAY OF LIPOPROTEIN(A): CPT | Mod: HCNC | Performed by: INTERNAL MEDICINE

## 2024-12-02 PROCEDURE — 82172 ASSAY OF APOLIPOPROTEIN: CPT | Mod: HCNC | Performed by: INTERNAL MEDICINE

## 2024-12-04 LAB — APO B SERPL-MCNC: 59 MG/DL

## 2024-12-05 LAB — LPA SERPL-MCNC: 18 MG/DL (ref 0–30)

## 2024-12-31 ENCOUNTER — OFFICE VISIT (OUTPATIENT)
Dept: CARDIOLOGY | Facility: CLINIC | Age: 79
End: 2024-12-31
Payer: MEDICARE

## 2024-12-31 VITALS — DIASTOLIC BLOOD PRESSURE: 78 MMHG | SYSTOLIC BLOOD PRESSURE: 127 MMHG

## 2024-12-31 DIAGNOSIS — I35.0 NONRHEUMATIC AORTIC VALVE STENOSIS: ICD-10-CM

## 2024-12-31 DIAGNOSIS — I10 ESSENTIAL HYPERTENSION: ICD-10-CM

## 2024-12-31 DIAGNOSIS — E78.00 PURE HYPERCHOLESTEROLEMIA: ICD-10-CM

## 2024-12-31 DIAGNOSIS — I77.810 ASCENDING AORTA DILATION: ICD-10-CM

## 2024-12-31 DIAGNOSIS — I25.10 CORONARY ARTERY DISEASE INVOLVING NATIVE CORONARY ARTERY OF NATIVE HEART WITHOUT ANGINA PECTORIS: Primary | ICD-10-CM

## 2024-12-31 PROCEDURE — 99999 PR PBB SHADOW E&M-EST. PATIENT-LVL III: CPT | Mod: PBBFAC,,, | Performed by: INTERNAL MEDICINE

## 2024-12-31 PROCEDURE — 3078F DIAST BP <80 MM HG: CPT | Mod: CPTII,S$GLB,, | Performed by: INTERNAL MEDICINE

## 2024-12-31 PROCEDURE — 99213 OFFICE O/P EST LOW 20 MIN: CPT | Mod: S$GLB,,, | Performed by: INTERNAL MEDICINE

## 2024-12-31 PROCEDURE — 1101F PT FALLS ASSESS-DOCD LE1/YR: CPT | Mod: CPTII,S$GLB,, | Performed by: INTERNAL MEDICINE

## 2024-12-31 PROCEDURE — 3288F FALL RISK ASSESSMENT DOCD: CPT | Mod: CPTII,S$GLB,, | Performed by: INTERNAL MEDICINE

## 2024-12-31 PROCEDURE — 3074F SYST BP LT 130 MM HG: CPT | Mod: CPTII,S$GLB,, | Performed by: INTERNAL MEDICINE

## 2024-12-31 PROCEDURE — 1159F MED LIST DOCD IN RCRD: CPT | Mod: CPTII,S$GLB,, | Performed by: INTERNAL MEDICINE

## 2024-12-31 NOTE — PROGRESS NOTES
Subjective:   12/31/2024     Patient ID:  Terrell Albrecht is a 79 y.o. male who presents for evaulation of Follow-up      Comes in for follow-up.  He is known to have mild aortic valve stenosis on prior echocardiography a year ago, recent ECHO suggest moderate stenosis with mild ascending aortic dilatation.  He is not having chest pains or tightness, PND or orthopnea.      Recent Echocardiography in 11/24 shows moderate stenosis with a peak velocity of 3.1 m/sec and a mean gradient of 23 mm Hg.  The ascending aorta was moderately dilated.  A bicuspid valve could not be totally occluded.    Prior ultrasound of the carotid shows bilateral atherosclerosis with less than 50% stenosis.      He does have hypertension, currently takes losartan 100 mg daily.      Hypercholesterolemia is present with an elevated calcium score, 88th percentile, begun on rosuvastatin 20 mg nightly, in December of 2024, total cholesterol 124, HDL 54, LDL 63, triglycerides 62.  Lipoprotein small a was normal, APO B is 58.          Note subsequent to calcium score:  Discussed with patient, he has fairly marked elevation of calcium score, 88% of men his age have calcium score is lower than he does.  Would recommend high-dose statin therapy, will send a prescription for rosuvastatin 20 mg nightly, check lab in 1 month.     Pending is carotid and echocardiogram      Prior Assessment and Plan:     Aortic valve stenosis, etiology of cardiac valve disease unspecified  Comments:  Asymptomatic   Repeat echo  Orders:  -     Ambulatory referral/consult to Cardiology  -     IN OFFICE EKG 12-LEAD (to Muse)  -     Echo; Future; Expected date: 10/31/2024     Essential hypertension  Comments:  Blood pressure always well controlled at home, he will continue follow.  Would add amlodipine 2.5 q.h.s. if elevated  Orders:  -     Ambulatory referral/consult to Cardiology  -     IN OFFICE EKG 12-LEAD (to Muse)     Encounter for screening for cardiovascular  disorders  -     CT Cardiac Scoring; Future; Expected date: 10/24/2024     Bilateral carotid bruits  Comments:  Carotid ultrasounds to be obtained  Orders:  -     CV Ultrasound Bilateral Doppler Carotid; Future; Expected date: 10/25/2024           Follow up in about 15 months (around 2026).        Prior cardiology note reviewed:  Terrell Albrecht is a 78 y.o. male with a PMHx of mild-to-moderate aortic stenosis, hypertension, hypothyroidism, hyperlipidemia, and prediabetes who presents to clinic to establish care. Patient was referred to clinic by PCP due to systolic murmur and aortic valve stenosis. Doing well denied any chest pain, shortness of breath, palpitations, orthopnea or PND. 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 His wife has Parkinson disease and she participates in Parkinson boxing classes. Never had any cardiac conditions before. Had an echocardiogram that showed mild-to-moderate aortic stenosis    Past medical history as above  Family history mother with heart issues but she  in her 90s  Social history denied any tobacco use, alcohol or drug abuse.  Impression  The left ventricle is normal in size.  There is mild to moderate concentric left ventricular hypertrophy.  Ejection Fraction = 60-65%.  There is no left ventricular diastolic dysfunction.  Mild to moderate valvular aortic stenosis.  Trace to mild aortic regurgitation.  There is trace mitral regurgitation.  There is mild tricuspid regurgitation.  Peak PA systolic pressure is normal  ____________________________________________  The aortic valve is trileaflet. Mild to moderate valvular aortic stenosis. The calculated aortic  valve area using the continuity equation is 1.3 cm2. The peak aortic valve gradient 33 mmHg. The  mean aortic valve gradient 20 mmHg. Trace to mild aortic regurgitation.    Plan:  # mild-to-moderate aortic valve  stenosis, asymptomatic. Will continue to monitor and repeat echocardiogram in 1 year    # hypertension, continue losartan 100 mg daily. Blood pressure in clinic today 160/90, it was normal when he was seen by Dr. Birch, reported being anxious, on recheck 148/78, will continue to monitor    # hyperlipidemia, not on any medication. Lipid panel on 08/05/2023 showed Cholesterol 208, HDL 52, , and Triglycerides 87. Will start him on Lipitor 20 mg daily          No past medical history on file.    Review of patient's allergies indicates:   Allergen Reactions    Sulfa (sulfonamide antibiotics)          Current Outpatient Medications:     buPROPion (WELLBUTRIN XL) 300 MG 24 hr tablet, Take 1 tablet (300 mg total) by mouth once daily., Disp: 90 tablet, Rfl: 3    clonazePAM (KLONOPIN) 0.5 MG tablet, Take 1 tablet (0.5 mg total) by mouth 4 (four) times daily as needed for Anxiety., Disp: 120 tablet, Rfl: 5    lamoTRIgine (LAMICTAL) 200 MG tablet, Take 1 tablet (200 mg total) by mouth once daily., Disp: 90 tablet, Rfl: 3    levothyroxine (SYNTHROID) 88 MCG tablet, Take 1 tablet (88 mcg total) by mouth before breakfast., Disp: 90 tablet, Rfl: 3    losartan (COZAAR) 100 MG tablet, Take 1 tablet (100 mg total) by mouth once daily., Disp: 90 tablet, Rfl: 3    rosuvastatin (CRESTOR) 20 MG tablet, Take 1 tablet (20 mg total) by mouth every evening., Disp: 90 tablet, Rfl: 3     Objective:   Review of Systems   Cardiovascular:  Negative for chest pain, claudication, cyanosis, dyspnea on exertion, irregular heartbeat, leg swelling, near-syncope, orthopnea, palpitations, paroxysmal nocturnal dyspnea and syncope.         Vitals:    12/31/24 0957   BP: 127/78   Pulse: (P) 62       Wt Readings from Last 3 Encounters:   12/31/24 (P) 87.2 kg (192 lb 3.9 oz)   11/04/24 81.6 kg (180 lb)   10/24/24 81.9 kg (180 lb 8.9 oz)     Temp Readings from Last 3 Encounters:   11/02/22 98.9 °F (37.2 °C)   01/03/20 98.4 °F (36.9 °C)     BP Readings  from Last 3 Encounters:   12/31/24 127/78   11/04/24 106/80   10/24/24 120/70     Pulse Readings from Last 3 Encounters:   12/31/24 (P) 62   11/04/24 (!) 58   10/24/24 64             Physical Exam  Vitals reviewed.   Constitutional:       General: He is not in acute distress.     Appearance: He is well-developed.   HENT:      Head: Normocephalic and atraumatic.      Nose: Nose normal.   Eyes:      Conjunctiva/sclera: Conjunctivae normal.      Pupils: Pupils are equal, round, and reactive to light.   Neck:      Vascular: Carotid bruit present. No JVD.   Cardiovascular:      Rate and Rhythm: Normal rate and regular rhythm.      Pulses: Normal pulses and intact distal pulses.      Heart sounds: Murmur (Grade 2 systolic ejection murmur) heard.      No friction rub. No gallop.   Pulmonary:      Effort: Pulmonary effort is normal. No respiratory distress.      Breath sounds: Normal breath sounds. No wheezing or rales.   Chest:      Chest wall: No tenderness.   Abdominal:      General: Bowel sounds are normal. There is no distension.      Palpations: Abdomen is soft.      Tenderness: There is no abdominal tenderness.   Musculoskeletal:         General: No tenderness or deformity. Normal range of motion.      Cervical back: Normal range of motion and neck supple.      Right lower leg: No edema.      Left lower leg: No edema.   Skin:     General: Skin is warm and dry.      Findings: No erythema or rash.   Neurological:      Mental Status: He is alert and oriented to person, place, and time.      Cranial Nerves: No cranial nerve deficit.      Motor: No abnormal muscle tone.      Coordination: Coordination normal.   Psychiatric:         Behavior: Behavior normal.         Thought Content: Thought content normal.         Judgment: Judgment normal.         Lab Results   Component Value Date    CHOL 129 12/02/2024    CHOL 190 03/26/2024    CHOL 167 08/17/2022     Lab Results   Component Value Date    HDL 54 12/02/2024    HDL 41  03/26/2024    HDL 38 (L) 08/17/2022     Lab Results   Component Value Date    LDLCALC 62.6 (L) 12/02/2024    LDLCALC 132.2 03/26/2024    LDLCALC 113 08/17/2022     Lab Results   Component Value Date    ALT 19 09/25/2024    AST 22 09/25/2024    AST 29 03/26/2024    AST 27 08/05/2023     Lab Results   Component Value Date    CREATININE 1.2 09/25/2024    BUN 15 09/25/2024     09/25/2024    K 4.5 09/25/2024    CO2 24 09/25/2024    CO2 27 03/26/2024    CO2 28 08/05/2023     Lab Results   Component Value Date    HGB 12.8 (L) 09/25/2024    HCT 39.3 (L) 09/25/2024    HCT 37.0 (L) 03/26/2024             Assessment and Plan:     Coronary artery disease involving native coronary artery of native heart without angina pectoris  Comments:  Manifested as elevated calcium score of 88th percentile  Asymptomatic    Essential hypertension  Comments:  Well controlled at home    Nonrheumatic aortic valve stenosis  Comments:  Gradual worsening, repeat echo in 1 year or with development of symptoms    Pure hypercholesterolemia  Comments:  Now excellent on high-dose statin therapy    Ascending aorta dilation  Comments:  ECHO 1 year           Follow up in about 6 months (around 6/30/2025).          Future Appointments   Date Time Provider Department Center   3/25/2025  9:30 AM Fior Jay MD [facility] [place]   6/17/2025 11:40 AM Lauro Alfred Jr., MD [facility] [place]

## 2025-01-04 DIAGNOSIS — F31.9 BIPOLAR I DISORDER: ICD-10-CM

## 2025-01-04 RX ORDER — BUPROPION HYDROCHLORIDE 300 MG/1
300 TABLET ORAL
Qty: 90 TABLET | Refills: 0 | Status: SHIPPED | OUTPATIENT
Start: 2025-01-04

## 2025-01-04 NOTE — TELEPHONE ENCOUNTER
No care due was identified.  Health [facility] Embedded Care Due Messages. Reference number: [id number].   1/04/2025 12:37:14 AM CST

## 2025-01-05 DIAGNOSIS — E03.9 ACQUIRED HYPOTHYROIDISM: ICD-10-CM

## 2025-01-05 DIAGNOSIS — F31.9 BIPOLAR I DISORDER: ICD-10-CM

## 2025-01-05 DIAGNOSIS — I10 ESSENTIAL HYPERTENSION: ICD-10-CM

## 2025-01-05 NOTE — TELEPHONE ENCOUNTER
Refill Decision Note   Terrell Christinelamine  is requesting a refill authorization.  Brief Assessment and Rationale for Refill:  Approve     Medication Therapy Plan:         Comments:     Note composed:10:55 PM 01/04/2025

## 2025-01-05 NOTE — TELEPHONE ENCOUNTER
No care due was identified.  [facility] Embedded Care Due Messages. Reference number: [id number].   1/05/2025 12:45:56 PM CST

## 2025-01-06 RX ORDER — LOSARTAN POTASSIUM 100 MG/1
100 TABLET ORAL DAILY
Qty: 90 TABLET | Refills: 0 | Status: SHIPPED | OUTPATIENT
Start: 2025-01-06

## 2025-01-06 RX ORDER — BUPROPION HYDROCHLORIDE 300 MG/1
300 TABLET ORAL DAILY
Qty: 90 TABLET | Refills: 0 | OUTPATIENT
Start: 2025-01-06

## 2025-01-06 RX ORDER — LEVOTHYROXINE SODIUM 88 UG/1
88 TABLET ORAL
Qty: 90 TABLET | Refills: 0 | Status: SHIPPED | OUTPATIENT
Start: 2025-01-06

## 2025-01-06 RX ORDER — LAMOTRIGINE 200 MG/1
200 TABLET ORAL DAILY
Qty: 90 TABLET | Refills: 3 | Status: SHIPPED | OUTPATIENT
Start: 2025-01-06

## 2025-01-06 NOTE — TELEPHONE ENCOUNTER
Refill Routing Note   Medication(s) are not appropriate for processing by [facility] for the following reason(s):        Outside of protocol    [facility] action(s):  Approve  Quick Discontinue  Route               Appointments  past 12m or future 3m with PCP    Date Provider   Last Visit   1/2/2024 Fior Jay MD   Next Visit   3/25/2025 Fior Jay MD   ED visits in past 90 days: 0        Note composed:12:38 PM 01/06/2025

## 2025-02-05 ENCOUNTER — OFFICE VISIT (OUTPATIENT)
Dept: URGENT CARE | Facility: CLINIC | Age: 80
End: 2025-02-05
Payer: MEDICARE

## 2025-02-05 VITALS
HEART RATE: 73 BPM | RESPIRATION RATE: 16 BRPM | OXYGEN SATURATION: 99 % | SYSTOLIC BLOOD PRESSURE: 172 MMHG | DIASTOLIC BLOOD PRESSURE: 84 MMHG | WEIGHT: 192 LBS | TEMPERATURE: 99 F | BODY MASS INDEX: 27.55 KG/M2

## 2025-02-05 DIAGNOSIS — J31.0 RHINITIS, UNSPECIFIED TYPE: Primary | ICD-10-CM

## 2025-02-05 DIAGNOSIS — H00.015 HORDEOLUM EXTERNUM OF LEFT LOWER EYELID: ICD-10-CM

## 2025-02-05 DIAGNOSIS — J32.9 CHRONIC SINUSITIS, UNSPECIFIED LOCATION: ICD-10-CM

## 2025-02-05 DIAGNOSIS — R05.9 COUGH, UNSPECIFIED TYPE: ICD-10-CM

## 2025-02-05 PROCEDURE — 99203 OFFICE O/P NEW LOW 30 MIN: CPT | Mod: S$GLB,,, | Performed by: FAMILY MEDICINE

## 2025-02-05 RX ORDER — BENZONATATE 200 MG/1
200 CAPSULE ORAL 3 TIMES DAILY PRN
Qty: 30 CAPSULE | Refills: 0 | Status: SHIPPED | OUTPATIENT
Start: 2025-02-05 | End: 2025-02-15

## 2025-02-05 RX ORDER — DOXYCYCLINE HYCLATE 100 MG
100 TABLET ORAL 2 TIMES DAILY
Qty: 20 TABLET | Refills: 0 | Status: SHIPPED | OUTPATIENT
Start: 2025-02-05

## 2025-02-05 RX ORDER — ALFUZOSIN HYDROCHLORIDE 10 MG/1
TABLET, EXTENDED RELEASE ORAL
COMMUNITY
Start: 2025-01-05

## 2025-02-05 RX ORDER — IPRATROPIUM BROMIDE 21 UG/1
2 SPRAY, METERED NASAL 2 TIMES DAILY PRN
Qty: 30 ML | Refills: 0 | Status: SHIPPED | OUTPATIENT
Start: 2025-02-05

## 2025-02-05 RX ORDER — ERYTHROMYCIN 5 MG/G
OINTMENT OPHTHALMIC EVERY 8 HOURS
Qty: 3.5 G | Refills: 0 | Status: SHIPPED | OUTPATIENT
Start: 2025-02-05

## 2025-02-05 NOTE — PROGRESS NOTES
Subjective:      Patient ID: Terrell Albrecht is a 79 y.o. male.    Vitals:  vitals were not taken for this visit.     Chief Complaint: No chief complaint on file.    This is a 79 y.o. male who presents today with a chief complaint of productive cough, nasal congestion and runny nose x 3-4 weeks. Pt also presents with fatigue since yesterday. No ear px, ear congestion, nausea, vomiting, diarrhea, abd px, fever, sore throat or headache    Home tx: flonase (approx 4-5 times)    [facility]: HTN,     Sinus Problem  This is a new problem. The current episode started 1 to 4 weeks ago. The problem is unchanged. There has been no fever. His pain is at a severity of 0/10. He is experiencing no pain. Associated symptoms include congestion, coughing, sinus pressure and sneezing. Pertinent negatives include no chills, ear pain, headaches, neck pain or sore throat.       Constitution: Negative for chills.   HENT:  Positive for congestion and sinus pressure. Negative for ear pain and sore throat.    Neck: Negative for neck pain.   Respiratory:  Positive for cough.    Allergic/Immunologic: Positive for sneezing.   Neurological:  Negative for headaches.    Objective:     Physical Exam   Constitutional: He is oriented to person, place, and time. He does not appear ill. No distress. normal  HENT:   Head: Normocephalic and atraumatic.   Ears:   Right Ear: Tympanic membrane, external ear and ear canal normal.   Left Ear: Tympanic membrane, external ear and ear canal normal.   Nose: Rhinorrhea and congestion present. Right sinus exhibits frontal sinus tenderness. Left sinus exhibits frontal sinus tenderness.   Mouth/Throat: Mucous membranes are moist. No oropharyngeal exudate or posterior oropharyngeal erythema. Oropharynx is clear.   Eyes: Pupils are equal, round, and reactive to light. Left eye exhibits hordeolum. Left conjunctiva is injected.     Extraocular movement intact      Comments: Lower lid stye   Neck: Neck supple. No neck rigidity  present.   Cardiovascular: Normal rate, regular rhythm and normal pulses.   No murmur heard.  Pulmonary/Chest: Effort normal and breath sounds normal. He has no wheezes.   Abdominal: Normal appearance and bowel sounds are normal. Soft. flat abdomen   Neurological: no focal deficit. He is alert, oriented to person, place, and time and at baseline.   Skin: Skin is warm and dry. Capillary refill takes less than 2 seconds.   Psychiatric: His behavior is normal. Mood, judgment and thought content normal.   Nursing note and vitals reviewed.      Assessment:     Plan:   1. Rhinitis, unspecified type  - ipratropium (ATROVENT) 21 mcg (0.03 %) nasal spray; 2 sprays by Each Nostril route 2 (two) times daily as needed for Rhinitis. (Patient not taking: Reported on 2/5/2025)  Dispense: 30 mL; Refill: 0    2. Chronic sinusitis, unspecified location  - doxycycline (VIBRA-TABS) 100 MG tablet; Take 1 tablet (100 mg total) by mouth 2 (two) times daily.  Dispense: 20 tablet; Refill: 0    3. Cough, unspecified type  - benzonatate (TESSALON) 200 MG capsule; Take 1 capsule (200 mg total) by mouth 3 (three) times daily as needed.  Dispense: 30 capsule; Refill: 0    4. Hordeolum externum of left lower eyelid  - erythromycin (ROMYCIN) ophthalmic ointment; Place into both eyes every 8 (eight) hours.  Dispense: 3.5 g; Refill: 0

## 2025-02-24 DIAGNOSIS — Z00.00 ENCOUNTER FOR MEDICARE ANNUAL WELLNESS EXAM: ICD-10-CM

## 2025-03-25 ENCOUNTER — OFFICE VISIT (OUTPATIENT)
Dept: PRIMARY CARE CLINIC | Facility: CLINIC | Age: 80
End: 2025-03-25
Payer: MEDICARE

## 2025-03-25 ENCOUNTER — LAB VISIT (OUTPATIENT)
Dept: LAB | Facility: HOSPITAL | Age: 80
End: 2025-03-25
Attending: INTERNAL MEDICINE
Payer: MEDICARE

## 2025-03-25 VITALS
SYSTOLIC BLOOD PRESSURE: 124 MMHG | DIASTOLIC BLOOD PRESSURE: 74 MMHG | OXYGEN SATURATION: 98 % | BODY MASS INDEX: 27.65 KG/M2 | WEIGHT: 193.13 LBS | HEART RATE: 72 BPM | HEIGHT: 70 IN

## 2025-03-25 DIAGNOSIS — R73.03 PREDIABETES: ICD-10-CM

## 2025-03-25 DIAGNOSIS — I77.810 ASCENDING AORTA DILATION: ICD-10-CM

## 2025-03-25 DIAGNOSIS — E78.00 PURE HYPERCHOLESTEROLEMIA: ICD-10-CM

## 2025-03-25 DIAGNOSIS — E03.9 ACQUIRED HYPOTHYROIDISM: ICD-10-CM

## 2025-03-25 DIAGNOSIS — E66.3 OVERWEIGHT: ICD-10-CM

## 2025-03-25 DIAGNOSIS — E03.9 HYPOTHYROIDISM, UNSPECIFIED TYPE: ICD-10-CM

## 2025-03-25 DIAGNOSIS — F31.9 BIPOLAR I DISORDER: ICD-10-CM

## 2025-03-25 DIAGNOSIS — Z00.00 ANNUAL PHYSICAL EXAM: Primary | ICD-10-CM

## 2025-03-25 DIAGNOSIS — I10 ESSENTIAL HYPERTENSION: ICD-10-CM

## 2025-03-25 PROBLEM — R05.9 COUGH: Status: RESOLVED | Noted: 2025-02-05 | Resolved: 2025-03-25

## 2025-03-25 LAB
EAG (OHS): 120 MG/DL (ref 68–131)
HBA1C MFR BLD: 5.8 % (ref 4–5.6)
T4 FREE SERPL-MCNC: 0.99 NG/DL (ref 0.71–1.51)
TSH SERPL-ACNC: 3.11 UIU/ML (ref 0.4–4)

## 2025-03-25 PROCEDURE — 3078F DIAST BP <80 MM HG: CPT | Mod: CPTII,S$GLB,, | Performed by: INTERNAL MEDICINE

## 2025-03-25 PROCEDURE — 84439 ASSAY OF FREE THYROXINE: CPT | Mod: HCNC

## 2025-03-25 PROCEDURE — 84443 ASSAY THYROID STIM HORMONE: CPT | Mod: HCNC

## 2025-03-25 PROCEDURE — 99397 PER PM REEVAL EST PAT 65+ YR: CPT | Mod: S$GLB,,, | Performed by: INTERNAL MEDICINE

## 2025-03-25 PROCEDURE — 36415 COLL VENOUS BLD VENIPUNCTURE: CPT

## 2025-03-25 PROCEDURE — 99999 PR PBB SHADOW E&M-EST. PATIENT-LVL III: CPT | Mod: PBBFAC,,, | Performed by: INTERNAL MEDICINE

## 2025-03-25 PROCEDURE — 83036 HEMOGLOBIN GLYCOSYLATED A1C: CPT | Mod: HCNC

## 2025-03-25 PROCEDURE — 1126F AMNT PAIN NOTED NONE PRSNT: CPT | Mod: CPTII,S$GLB,, | Performed by: INTERNAL MEDICINE

## 2025-03-25 PROCEDURE — 1159F MED LIST DOCD IN RCRD: CPT | Mod: CPTII,S$GLB,, | Performed by: INTERNAL MEDICINE

## 2025-03-25 PROCEDURE — 3074F SYST BP LT 130 MM HG: CPT | Mod: CPTII,S$GLB,, | Performed by: INTERNAL MEDICINE

## 2025-03-25 RX ORDER — LOSARTAN POTASSIUM 100 MG/1
100 TABLET ORAL DAILY
Qty: 90 TABLET | Refills: 3 | Status: SHIPPED | OUTPATIENT
Start: 2025-03-25

## 2025-03-25 RX ORDER — BUPROPION HYDROCHLORIDE 300 MG/1
300 TABLET ORAL DAILY
Qty: 90 TABLET | Refills: 3 | Status: SHIPPED | OUTPATIENT
Start: 2025-03-25

## 2025-03-25 RX ORDER — LEVOTHYROXINE SODIUM 88 UG/1
88 TABLET ORAL
Qty: 90 TABLET | Refills: 3 | Status: SHIPPED | OUTPATIENT
Start: 2025-03-25

## 2025-03-25 NOTE — ASSESSMENT & PLAN NOTE
On crestor, sees Dr. Alfred.  The ASCVD Risk score (Mariah PLUNKETT, et al., 2019) failed to calculate for the following reasons:    The valid total cholesterol range is 130 to 320 mg/dL

## 2025-03-25 NOTE — PROGRESS NOTES
[facility] Note    Chief Complaint      Chief Complaint   Patient presents with    Follow-up     History of Present Illness      Terrell Albrecht is a 79 y.o. male who presents today for Annual preventative visit.  Patient comes to appointment alone.  Cards: [place] Uro: [place], Psych: [place]    Patient is a nonsmoker.  Patient reports wearing seatbelt when riding in a car.  Patient denies falls since last visit and does not use a mobility aid.      Problem List Items Addressed This Visit       Ascending aorta dilation    Overview   Measures 4.6 cm in 11/24 on both echocardiography and CT of the chest without contrast.  Moderate risk of complications.         Current Assessment & Plan   On 10/2024 TTE, sees cardiology.         Bipolar I disorder    Current Assessment & Plan   Stable, on wellbutrin, lamictal, clonazepam PRN.    Previously seeing Dr. Oneil prior to custodial.         Relevant Medications    buPROPion (WELLBUTRIN XL) 300 MG 24 hr tablet    Essential hypertension    Current Assessment & Plan   Bp controlled on Losartan 100 mg daily, no CP/SOB/HA.  Not watching salt in diet.         Relevant Medications    losartan (COZAAR) 100 MG tablet    Hypothyroidism    Current Assessment & Plan   On levothyroxine 88 mcg daily, stable.         Relevant Medications    levothyroxine (SYNTHROID) 88 MCG tablet    Other Relevant Orders    TSH    T4, Free    TSH    T4, Free    Overweight    Current Assessment & Plan   Not doing well on diet.         Prediabetes    Current Assessment & Plan   Not on meds, not doing as well on diet recently but exercising vigorously.         Relevant Orders    Hemoglobin A1C    Hemoglobin A1C    Pure hypercholesterolemia    Current Assessment & Plan   On crestor, sees Dr. Alfred.  The ASCVD Risk score (Mariah PLUNKETT, et al., 2019) failed to calculate for the following reasons:    The valid total cholesterol range is 130 to 320 mg/dL           Relevant Orders    CBC  Auto Differential    Lipid Panel    Comprehensive Metabolic Panel     Other Visit Diagnoses         Annual physical exam    -  Primary            Health Maintenance   Topic Date Due    Hepatitis C Screening  Never done    Shingles Vaccine (2 of 3) 2016    Pneumococcal Vaccines (Age 50+) (2 of 2 - PCV) 2025    Hemoglobin A1c (Prediabetes)  2025    Lipid Panel  2029    TETANUS VACCINE  2034    Influenza Vaccine  Completed    COVID-19 Vaccine  Completed    RSV Vaccine (Age 60+ and Pregnant patients)  Completed       History reviewed. No pertinent past medical history.    Past Surgical History:   Procedure Laterality Date     SECTION      INSERTION OF PENILE IMPLANT BY INFRAPUBIC APPROACH      ROTATOR CUFF REPAIR Right        family history includes Arthritis in his mother; Cancer in his mother; Depression in his brother and sister; Diabetes in his brother; Heart disease in his brother and mother; Hypertension in his mother.    Social History     Tobacco Use    Smoking status: Never     Passive exposure: Never    Smokeless tobacco: Never   Substance Use Topics    Alcohol use: Not Currently     Comment: Sober Active Member of [employer] for 43 years    Drug use: Never       Review of Systems   Constitutional:  Negative for chills and fever.   HENT:  Negative for hearing loss.    Eyes:  Negative for discharge.   Respiratory:  Negative for cough, shortness of breath and wheezing.    Cardiovascular:  Negative for chest pain and palpitations.   Gastrointestinal:  Negative for blood in stool, constipation, diarrhea, nausea and vomiting.   Genitourinary:  Negative for dysuria, hematuria and urgency.   Musculoskeletal:  Negative for falls and neck pain.   Neurological:  Negative for weakness and headaches.   Endo/Heme/Allergies:  Negative for polydipsia.        Outpatient Encounter Medications as of 3/25/2025   Medication Sig Dispense Refill    clonazePAM (KLONOPIN) 0.5 MG tablet Take 1 tablet (0.5  "mg total) by mouth 4 (four) times daily as needed for Anxiety. 120 tablet 5    lamoTRIgine (LAMICTAL) 200 MG tablet Take 1 tablet (200 mg total) by mouth once daily. 90 tablet 3    rosuvastatin (CRESTOR) 20 MG tablet Take 1 tablet (20 mg total) by mouth every evening. 90 tablet 3    [DISCONTINUED] buPROPion (WELLBUTRIN XL) 300 MG 24 hr tablet TAKE 1 TABLET BY MOUTH EVERY DAY 90 tablet 0    [DISCONTINUED] levothyroxine (SYNTHROID) 88 MCG tablet Take 1 tablet (88 mcg total) by mouth before breakfast. 90 tablet 0    [DISCONTINUED] losartan (COZAAR) 100 MG tablet Take 1 tablet (100 mg total) by mouth once daily. 90 tablet 0    buPROPion (WELLBUTRIN XL) 300 MG 24 hr tablet Take 1 tablet (300 mg total) by mouth once daily. 90 tablet 3    levothyroxine (SYNTHROID) 88 MCG tablet Take 1 tablet (88 mcg total) by mouth before breakfast. 90 tablet 3    losartan (COZAAR) 100 MG tablet Take 1 tablet (100 mg total) by mouth once daily. 90 tablet 3    [DISCONTINUED] alfuzosin (UROXATRAL) 10 mg Tb24  (Patient not taking: Reported on 3/25/2025)      [DISCONTINUED] doxycycline (VIBRA-TABS) 100 MG tablet Take 1 tablet (100 mg total) by mouth 2 (two) times daily. 20 tablet 0    [DISCONTINUED] erythromycin (ROMYCIN) ophthalmic ointment Place into both eyes every 8 (eight) hours. 3.5 g 0    [DISCONTINUED] ipratropium (ATROVENT) 21 mcg (0.03 %) nasal spray 2 sprays by Each Nostril route 2 (two) times daily as needed for Rhinitis. (Patient not taking: Reported on 2/5/2025) 30 mL 0     No facility-administered encounter medications on file as of 3/25/2025.        Review of patient's allergies indicates:   Allergen Reactions    Sulfa (sulfonamide antibiotics)        Physical Exam      Vital Signs  Pulse: 72  SpO2: 98 %  BP: 124/74  Pain Score: 0-No pain  Height and Weight  Height: 5' 10" (177.8 cm)  Weight: 87.6 kg (193 lb 2 oz)  BSA (Calculated - sq m): 2.08 sq meters  BMI (Calculated): 27.7  Weight in (lb) to have BMI = 25: " "173.9]    Physical Exam  Constitutional:       Appearance: He is well-developed.   HENT:      Head: Normocephalic and atraumatic.   Neck:      Thyroid: No thyromegaly.   Cardiovascular:      Rate and Rhythm: Normal rate and regular rhythm.      Heart sounds: No murmur heard.  Pulmonary:      Effort: Pulmonary effort is normal. No respiratory distress.      Breath sounds: Normal breath sounds.   Abdominal:      General: There is no distension.      Palpations: Abdomen is soft.      Tenderness: There is no abdominal tenderness.   Skin:     General: Skin is warm and dry.   Neurological:      Mental Status: He is alert and oriented to person, place, and time.   Psychiatric:         Behavior: Behavior normal.          Laboratory:  CBC:  No results for input(s): "WBC", "RBC", "HGB", "HCT", "PLT", "MCV", "MCH", "MCHC" in the last 2160 hours.  CMP:  No results for input(s): "GLU", "CALCIUM", "ALBUMIN", "PROT", "NA", "K", "CO2", "CL", "BUN", "ALKPHOS", "ALT", "AST", "BILITOT" in the last 2160 hours.    Invalid input(s): "CREATININ"  URINALYSIS:  No results for input(s): "COLORU", "CLARITYU", "SPECGRAV", "PHUR", "PROTEINUA", "GLUCOSEU", "BILIRUBINCON", "BLOODU", "WBCU", "RBCU", "BACTERIA", "MUCUS", "NITRITE", "LEUKOCYTESUR", "UROBILINOGEN", "HYALINECASTS" in the last 2160 hours.   LIPIDS:  No results for input(s): "TSH", "HDL", "CHOL", "TRIG", "LDLCALC", "CHOLHDL", "NONHDLCHOL", "TOTALCHOLEST" in the last 2160 hours.  TSH:  No results for input(s): "TSH" in the last 2160 hours.  A1C:  No results for input(s): "HGBA1C" in the last 2160 hours.    Radiology:  No results found in the last 30 days.     Assessment/Plan     Terrell Albrecht is a 79 y.o.male with:    1. Annual physical exam    2. Bipolar I disorder  - buPROPion (WELLBUTRIN XL) 300 MG 24 hr tablet; Take 1 tablet (300 mg total) by mouth once daily.  Dispense: 90 tablet; Refill: 3    3. Essential hypertension  - losartan (COZAAR) 100 MG tablet; Take 1 tablet (100 mg " total) by mouth once daily.  Dispense: 90 tablet; Refill: 3    4. Pure hypercholesterolemia  - CBC Auto Differential; Future  - Lipid Panel; Future  - Comprehensive Metabolic Panel; Future    5. Ascending aorta dilation    6. Prediabetes  - Hemoglobin A1C; Future  - Hemoglobin A1C; Future    7. Hypothyroidism, unspecified type  - TSH; Future  - T4, Free; Future  - TSH; Future  - T4, Free; Future    8. Acquired hypothyroidism  - levothyroxine (SYNTHROID) 88 MCG tablet; Take 1 tablet (88 mcg total) by mouth before breakfast.  Dispense: 90 tablet; Refill: 3    9. Overweight      -Continue current medications and maintain follow up with specialists.    -Follow up in about 6 months (around 9/25/2025) for follow up of medical problems.       Fior Jay MD  [facility]                  Answers submitted by the patient for this visit:  Review of Systems Questionnaire (Submitted on 12/29/2023)  activity change: No  unexpected weight change: No  rhinorrhea: No  trouble swallowing: No  visual disturbance: No  chest tightness: No  polyuria: No  difficulty urinating: No  joint swelling: No  arthralgias: No  confusion: No  dysphoric mood: No

## 2025-03-25 NOTE — ASSESSMENT & PLAN NOTE
Stable, on wellbutrin, lamictal, clonazepam PRN.    Previously seeing Dr. Oneil prior to half-way.

## 2025-03-26 ENCOUNTER — RESULTS FOLLOW-UP (OUTPATIENT)
Dept: PRIMARY CARE CLINIC | Facility: CLINIC | Age: 80
End: 2025-03-26

## 2025-04-16 ENCOUNTER — PATIENT MESSAGE (OUTPATIENT)
Dept: PRIMARY CARE CLINIC | Facility: CLINIC | Age: 80
End: 2025-04-16
Payer: MEDICARE

## 2025-04-16 DIAGNOSIS — F31.9 BIPOLAR I DISORDER: ICD-10-CM

## 2025-04-16 RX ORDER — CLONAZEPAM 0.5 MG/1
0.5 TABLET ORAL 4 TIMES DAILY PRN
Qty: 120 TABLET | Refills: 5 | Status: SHIPPED | OUTPATIENT
Start: 2025-04-16

## 2025-04-16 RX ORDER — CLONAZEPAM 0.5 MG/1
0.5 TABLET ORAL 4 TIMES DAILY PRN
Qty: 120 TABLET | Refills: 5 | OUTPATIENT
Start: 2025-04-16

## 2025-04-16 NOTE — TELEPHONE ENCOUNTER
No care due was identified.  Health [facility] Embedded Care Due Messages. Reference number: [id number].   4/16/2025 2:41:56 PM CDT

## 2025-04-16 NOTE — TELEPHONE ENCOUNTER
No care due was identified.  [facility] Embedded Care Due Messages. Reference number: [id number].   4/16/2025 2:53:11 PM CDT

## 2025-04-24 ENCOUNTER — OFFICE VISIT (OUTPATIENT)
Dept: UROLOGY | Facility: CLINIC | Age: 80
End: 2025-04-24
Payer: MEDICARE

## 2025-04-24 VITALS
HEIGHT: 70 IN | WEIGHT: 192 LBS | BODY MASS INDEX: 27.49 KG/M2 | SYSTOLIC BLOOD PRESSURE: 167 MMHG | DIASTOLIC BLOOD PRESSURE: 99 MMHG | HEART RATE: 80 BPM

## 2025-04-24 DIAGNOSIS — N52.01 ERECTILE DYSFUNCTION DUE TO ARTERIAL INSUFFICIENCY: ICD-10-CM

## 2025-04-24 DIAGNOSIS — N13.8 BPH WITH URINARY OBSTRUCTION: ICD-10-CM

## 2025-04-24 DIAGNOSIS — I10 ESSENTIAL HYPERTENSION: ICD-10-CM

## 2025-04-24 DIAGNOSIS — F52.32 DELAYED EJACULATION: Primary | ICD-10-CM

## 2025-04-24 DIAGNOSIS — N40.1 BPH WITH URINARY OBSTRUCTION: ICD-10-CM

## 2025-04-24 PROCEDURE — 3077F SYST BP >= 140 MM HG: CPT | Mod: HCNC,CPTII,S$GLB, | Performed by: UROLOGY

## 2025-04-24 PROCEDURE — 1159F MED LIST DOCD IN RCRD: CPT | Mod: HCNC,CPTII,S$GLB, | Performed by: UROLOGY

## 2025-04-24 PROCEDURE — 3288F FALL RISK ASSESSMENT DOCD: CPT | Mod: HCNC,CPTII,S$GLB, | Performed by: UROLOGY

## 2025-04-24 PROCEDURE — 1101F PT FALLS ASSESS-DOCD LE1/YR: CPT | Mod: HCNC,CPTII,S$GLB, | Performed by: UROLOGY

## 2025-04-24 PROCEDURE — 1160F RVW MEDS BY RX/DR IN RCRD: CPT | Mod: HCNC,CPTII,S$GLB, | Performed by: UROLOGY

## 2025-04-24 PROCEDURE — G2211 COMPLEX E/M VISIT ADD ON: HCPCS | Mod: HCNC,S$GLB,, | Performed by: UROLOGY

## 2025-04-24 PROCEDURE — 99999 PR PBB SHADOW E&M-EST. PATIENT-LVL III: CPT | Mod: PBBFAC,HCNC,, | Performed by: UROLOGY

## 2025-04-24 PROCEDURE — 1126F AMNT PAIN NOTED NONE PRSNT: CPT | Mod: HCNC,CPTII,S$GLB, | Performed by: UROLOGY

## 2025-04-24 PROCEDURE — 99203 OFFICE O/P NEW LOW 30 MIN: CPT | Mod: HCNC,S$GLB,, | Performed by: UROLOGY

## 2025-04-24 PROCEDURE — 3080F DIAST BP >= 90 MM HG: CPT | Mod: HCNC,CPTII,S$GLB, | Performed by: UROLOGY

## 2025-04-24 NOTE — LETTER
April 24, 2025        Fior Jay MD  [address]  [address]  [place] [address]             [facility] 4th Fl  [address]  [place] [address]  Phone: [phone]   Patient: Terrell Albrecht   MR Number: [id number]   YOB: 1945   Date of Visit: 4/24/2025       Dear Dr. Jay:    Thank you for referring Terrell Albrecht to me for evaluation. Attached you will find relevant portions of my assessment and plan of care.    If you have questions, please do not hesitate to call me. I look forward to following Terrell Albrecht along with you.    Sincerely,      Luis Lizarraga MD            CC  No Recipients    Enclosure

## 2025-04-24 NOTE — PROGRESS NOTES
CHIEF COMPLAINT:    Mr. Albrecht is a 79 y.o. male presenting for a consultation at the request of Dr. Jay. Patient presents with delayed ejaculation.    PRESENTING ILLNESS:    Terrell Albrecht is a 79 y.o. male who c/o delayed ejaculation.  He is on wellbutrin and klonopin.    He has severe ED. Is s/p placement of a 3 piece AMS IPP at [facility].    He has LUTS.  Nocturia x 2. Is pleased with how he voids.    REVIEW OF SYSTEMS:    Terrell Albrecht denies headache, blurred vision, fever, nausea, vomiting, chills, abdominal pain, chest pain, sore throat, bleeding per rectum, cough, SOB, recent loss of consciousness, recent mental status changes, seizures, dizziness, or upper or lower extremity weakness.    MARIO  1. 5  2. 5  3. 5  4. 5  5. 3      PATIENT HISTORY:    Past Medical History:   Diagnosis Date    Acquired hypothyroidism     Anxiety disorder, unspecified     Bipolar disorder, unspecified     High cholesterol        Past Surgical History:   Procedure Laterality Date     SECTION      INSERTION OF PENILE IMPLANT BY INFRAPUBIC APPROACH      ROTATOR CUFF REPAIR Right        Family History   Problem Relation Name Age of Onset    Arthritis Mother Duyen Albrecht     Cancer Mother Duyen Albrecht     Heart disease Mother Duyen Albrecht     Hypertension Mother Duyen Albrecht     Depression Sister Madison Albrecht     Heart disease Brother Tanner Albrecht     Depression Brother Tanner Albrecht     Diabetes Brother Tanner Albrecht        Social History[1]    Allergies:  Sulfa (sulfonamide antibiotics)    Medications:  Current Medications[2]    PHYSICAL EXAMINATION:    The patient generally appears in good health, is appropriately interactive, and is in no apparent distress.     Eyes: anicteric sclerae, moist conjunctivae; no lid-lag; PERRLA     HENT: Atraumatic; oropharynx clear with moist mucous membranes and no mucosal ulcerations;normal hard and soft palate.  No evidence of  "lymphadenopathy.    Neck: Trachea midline.  No thyromegaly.    Skin: No lesions.    Mental: Cooperative with normal affect.  Is oriented to time, place, and person.    Neuro: Grossly intact.    Chest: Normal inspiratory effort.   No accessory muscles.  No audible wheezes.  Respirations symmetric on inspiration and expiration.    Heart: Regular rhythm.      Abdomen:  Soft, non-tender. No masses or organomegaly. Bladder is not palpable. No evidence of flank discomfort. No evidence of inguinal hernia.    Genitourinary: The penis is circumcised with no evidence of plaques or induration. The urethral meatus is normal. The testes, epididymides, and cord structures are normal in size and contour bilaterally. The scrotum is normal in size and contour. The IPP components are palpable in the penis and scrotum.    Normal anal sphincter tone. No rectal mass.    The prostate is 40 g. Normal landmarks. Lateral sulci. Median furrow intact.  No nodularity or induration. Seminal vesicles are normal.    Extremities: No clubbing, cyanosis, or edema      LABS:      No results found for: "PSA", "PSADIAG", "PSATOTAL", "PSAFREE", "PSAFREEPCT"    IMPRESSION:    Encounter Diagnoses   Name Primary?    Delayed ejaculation Yes    Erectile dysfunction due to arterial insufficiency     BPH with urinary obstruction     Essential hypertension    HTN, stable      PLAN:    1. Discussed the pathophysiology of orgasm.  Recommend that he use positions to maximally stimulate the penis or try a penile vibrator.  Should also discuss stopping the wellbutrin and klonopin with his PCP.  2. Will observe his LUTS as they don't bother him.  3. Continue to use the IPP for his severe ED (affected by above comorbidities).   4. Visit today included increased complexity associated with the care of the episodic problem of severe ED (affected by above comorbidities). ddressed and managing the longitudinal care of the patient due to the serious and/or complex managed " problem(s) requiring an IPP.   5. RTC prn.    Copy to:            [1]   Social History  Socioeconomic History    Marital status:    Tobacco Use    Smoking status: Never     Passive exposure: Never    Smokeless tobacco: Never   Substance and Sexual Activity    Alcohol use: Not Currently     Comment: Sober Active Member of [employer] for 43 years    Drug use: Never    Sexual activity: Yes     Partners: Female     Social Drivers of Health     Financial Resource Strain: Low Risk  (4/23/2025)    Overall Financial Resource Strain (CARDIA)     Difficulty of Paying Living Expenses: Not hard at all   Food Insecurity: No Food Insecurity (4/23/2025)    Hunger Vital Sign     Worried About Running Out of Food in the Last Year: Never true     Ran Out of Food in the Last Year: Never true   Transportation Needs: No Transportation Needs (4/23/2025)    PRAPARE - Transportation     Lack of Transportation (Medical): No     Lack of Transportation (Non-Medical): No   Physical Activity: Sufficiently Active (4/23/2025)    Exercise Vital Sign     Days of Exercise per Week: 5 days     Minutes of Exercise per Session: 60 min   Stress: Stress Concern Present (4/23/2025)    [employer] - Occupational Stress Questionnaire     Feeling of Stress : To some extent   Housing Stability: Low Risk  (4/23/2025)    Housing Stability Vital Sign     Unable to Pay for Housing in the Last Year: No     Number of Times Moved in the Last Year: 0     Homeless in the Last Year: No   [2]   Current Outpatient Medications:     buPROPion (WELLBUTRIN XL) 300 MG 24 hr tablet, Take 1 tablet (300 mg total) by mouth once daily., Disp: 90 tablet, Rfl: 3    clonazePAM (KLONOPIN) 0.5 MG tablet, TAKE 1 TABLET (0.5 MG TOTAL) BY MOUTH 4 (FOUR) TIMES DAILY AS NEEDED FOR ANXIETY., Disp: 120 tablet, Rfl: 5    lamoTRIgine (LAMICTAL) 200 MG tablet, Take 1 tablet (200 mg total) by mouth once daily., Disp: 90 tablet, Rfl: 3    levothyroxine (SYNTHROID) 88  MCG tablet, Take 1 tablet (88 mcg total) by mouth before breakfast., Disp: 90 tablet, Rfl: 3    losartan (COZAAR) 100 MG tablet, Take 1 tablet (100 mg total) by mouth once daily., Disp: 90 tablet, Rfl: 3    rosuvastatin (CRESTOR) 20 MG tablet, Take 1 tablet (20 mg total) by mouth every evening., Disp: 90 tablet, Rfl: 3

## 2025-04-25 ENCOUNTER — PATIENT MESSAGE (OUTPATIENT)
Dept: PRIMARY CARE CLINIC | Facility: CLINIC | Age: 80
End: 2025-04-25
Payer: MEDICARE

## 2025-04-25 NOTE — TELEPHONE ENCOUNTER
LOV with Fior Jay MD , 3/25/2025    Please see patient message in regard to discontinuing the Wellbutrin per urology.    Urology note dated 4/25/25:  1. Discussed the pathophysiology of orgasm.  Recommend that he use positions to maximally stimulate the penis or try a penile vibrator.  Should also discuss stopping the wellbutrin and klonopin with his PCP.

## 2025-04-26 ENCOUNTER — PATIENT MESSAGE (OUTPATIENT)
Dept: PRIMARY CARE CLINIC | Facility: CLINIC | Age: 80
End: 2025-04-26
Payer: MEDICARE

## 2025-04-28 RX ORDER — BUPROPION HYDROCHLORIDE 150 MG/1
150 TABLET ORAL DAILY
Qty: 30 TABLET | Refills: 11 | Status: SHIPPED | OUTPATIENT
Start: 2025-04-28 | End: 2026-04-28

## 2025-04-28 NOTE — TELEPHONE ENCOUNTER
Rx sent for 150 mg dose of wellbutrin.  Take this daily for 2 weeks.  Then 1/2 tablet daily for 2 weeks.  Then 1/2 tablet every other day for 2 weeks.  THEN STOP.

## 2025-05-01 ENCOUNTER — PATIENT MESSAGE (OUTPATIENT)
Dept: PRIMARY CARE CLINIC | Facility: CLINIC | Age: 80
End: 2025-05-01
Payer: MEDICARE

## 2025-05-01 RX ORDER — BUPROPION HYDROCHLORIDE 300 MG/1
300 TABLET ORAL DAILY
Qty: 90 TABLET | Refills: 3 | Status: SHIPPED | OUTPATIENT
Start: 2025-05-01 | End: 2026-05-01

## 2025-05-01 NOTE — TELEPHONE ENCOUNTER
No care due was identified.  [facility] Embedded Care Due Messages. Reference number: [id number].   5/01/2025 2:08:56 PM CDT

## 2025-05-01 NOTE — TELEPHONE ENCOUNTER
LOV with Fior Jay MD , 3/25/2025 (Annual)  Pt would like to go back to previous prescription 300 mg wellbutrin.  Wellbutrin 300 mg pended, if appropriate.

## 2025-06-09 ENCOUNTER — PATIENT MESSAGE (OUTPATIENT)
Dept: PRIMARY CARE CLINIC | Facility: CLINIC | Age: 80
End: 2025-06-09
Payer: MEDICARE

## 2025-06-17 ENCOUNTER — OFFICE VISIT (OUTPATIENT)
Dept: CARDIOLOGY | Facility: CLINIC | Age: 80
End: 2025-06-17
Payer: MEDICARE

## 2025-06-17 VITALS — SYSTOLIC BLOOD PRESSURE: 120 MMHG | DIASTOLIC BLOOD PRESSURE: 70 MMHG

## 2025-06-17 DIAGNOSIS — I25.10 CORONARY ARTERY DISEASE INVOLVING NATIVE CORONARY ARTERY OF NATIVE HEART WITHOUT ANGINA PECTORIS: ICD-10-CM

## 2025-06-17 DIAGNOSIS — I77.810 ASCENDING AORTA DILATION: Primary | ICD-10-CM

## 2025-06-17 DIAGNOSIS — E78.2 MIXED HYPERLIPIDEMIA: ICD-10-CM

## 2025-06-17 DIAGNOSIS — I10 ESSENTIAL HYPERTENSION: ICD-10-CM

## 2025-06-17 DIAGNOSIS — I35.0 NONRHEUMATIC AORTIC VALVE STENOSIS: ICD-10-CM

## 2025-06-17 DIAGNOSIS — I65.23 CAROTID STENOSIS, ASYMPTOMATIC, BILATERAL: ICD-10-CM

## 2025-06-17 PROCEDURE — 3288F FALL RISK ASSESSMENT DOCD: CPT | Mod: CPTII,S$GLB,, | Performed by: INTERNAL MEDICINE

## 2025-06-17 PROCEDURE — 3078F DIAST BP <80 MM HG: CPT | Mod: CPTII,S$GLB,, | Performed by: INTERNAL MEDICINE

## 2025-06-17 PROCEDURE — 99999 PR PBB SHADOW E&M-EST. PATIENT-LVL III: CPT | Mod: PBBFAC,,, | Performed by: INTERNAL MEDICINE

## 2025-06-17 PROCEDURE — 3074F SYST BP LT 130 MM HG: CPT | Mod: CPTII,S$GLB,, | Performed by: INTERNAL MEDICINE

## 2025-06-17 PROCEDURE — 99214 OFFICE O/P EST MOD 30 MIN: CPT | Mod: S$GLB,,, | Performed by: INTERNAL MEDICINE

## 2025-06-17 PROCEDURE — 1159F MED LIST DOCD IN RCRD: CPT | Mod: CPTII,S$GLB,, | Performed by: INTERNAL MEDICINE

## 2025-06-17 PROCEDURE — 1101F PT FALLS ASSESS-DOCD LE1/YR: CPT | Mod: CPTII,S$GLB,, | Performed by: INTERNAL MEDICINE

## 2025-06-17 NOTE — PROGRESS NOTES
Subjective:   06/17/2025     Patient ID:  Terrell Albrecht is a 79 y.o. male who presents for evaulation of 6 Month Follow up       History of Present Illness    CHIEF COMPLAINT:  Presents for a routine six-month follow-up visit.    HPI:  Patient reports feeling well and maintaining physical activity, including recent participation in boxing. Home BP readings have been favorable, with recent measurements of 120/80 and 120/72. He notes consistently low heart rate. BP was slightly elevated during the office visit, which he attributes to white coat syndrome. Regarding cholesterol levels, he admits to poor adherence to dietary recommendations recently, suggesting a potential change in lipid profile since last check in 12/2024.    He denies any current issues, chest pain, tightness, or other cardiac symptoms.    CARDIAC HISTORY:  Echo: peak aortic valve velocity 3.1 m/s, mean gradient 23 mmHg, significant sclerosis, bicuspid valve not totally excluded  Carotid US: <50% stenosis  Ascending aorta mildly dilated  Aortic valve stenosis CAD: calcium buildup consistent with coronary disease    MEDICATIONS:  Losartan 100 mg daily for HTN  Rosuvastatin 20 mg daily for hypercholesterolemia    TEST RESULTS:  Patient's lab results from December 2024 show a total cholesterol of 129, HDL of 54, LDL of 63, and triglycerides of 62. His lipoprotein small A is normal, and Apo B is 58. The calcium score is markedly elevated, with 88% of males his age having lower scores.    IMAGING:  An ultrasound performed 6 months ago revealed no significant stenosis, with only a little thickening noted.    MEDICAL HISTORY:  Patient has a history of hypertension, hypercholesterolemia, and dilated ascending aorta.      ROS:  General: -fever, -chills, -fatigue, -weight gain, -weight loss  Eyes: -vision changes, -redness, -discharge  ENT: -ear pain, -nasal congestion, -sore throat  Cardiovascular: -chest pain, -palpitations, -lower extremity  edema  Respiratory: -cough, -shortness of breath  Gastrointestinal: -abdominal pain, -nausea, -vomiting, -diarrhea, -constipation, -blood in stool  Genitourinary: -dysuria, -hematuria, -frequency  Musculoskeletal: -joint pain, -muscle pain  Skin: -rash, -lesion  Neurological: -headache, -dizziness, -numbness, -tingling  Psychiatric: -anxiety, -depression, -sleep difficulty          Problem List[1]     Review of patient's allergies indicates:   Allergen Reactions    Sulfa (sulfonamide antibiotics)        Current Medications[2]     Objective:   Physical Exam    General: No acute distress. Well-developed. Well-nourished.  Eyes: EOMI. Sclerae anicteric.  HENT: Normocephalic. Atraumatic. Nares patent. Moist oral mucosa.  Cardiovascular: Regular rate. Regular rhythm. No murmurs. No rubs. No gallops. Normal S1, S2.  Respiratory: Normal respiratory effort. Clear to auscultation bilaterally. No rales. No rhonchi. No wheezing.  Musculoskeletal: No  obvious deformity.  Extremities: No lower extremity edema.  Neurological: Alert & oriented x3. No slurred speech. Normal gait.  Psychiatric: Normal mood. Normal affect. Good insight. Good judgment.  Skin: Warm. Dry. No rash.          Vitals:    06/17/25 1117   BP: 120/70   Pulse: (P) 69     Wt Readings from Last 3 Encounters:   06/17/25 (P) 91.3 kg (201 lb 4.5 oz)   04/24/25 87.1 kg (192 lb)   03/25/25 87.6 kg (193 lb 2 oz)     Temp Readings from Last 3 Encounters:   02/05/25 98.6 °F (37 °C) (Oral)   11/02/22 98.9 °F (37.2 °C)   01/03/20 98.4 °F (36.9 °C)     BP Readings from Last 3 Encounters:   06/17/25 120/70   04/24/25 (!) 167/99   03/25/25 124/74     Pulse Readings from Last 3 Encounters:   06/17/25 (P) 69   04/24/25 80   03/25/25 72               Lab Results   Component Value Date    CHOL 129 12/02/2024    CHOL 190 03/26/2024    CHOL 167 08/17/2022     Lab Results   Component Value Date    HDL 54 12/02/2024    HDL 41 03/26/2024    HDL 38 (L) 08/17/2022     Lab Results    Component Value Date    LDLCALC 62.6 (L) 12/02/2024    LDLCALC 132.2 03/26/2024    LDLCALC 113 08/17/2022     Lab Results   Component Value Date    ALT 19 09/25/2024    AST 22 09/25/2024    AST 29 03/26/2024    AST 27 08/05/2023     Lab Results   Component Value Date    CREATININE 1.2 09/25/2024    BUN 15 09/25/2024     09/25/2024    K 4.5 09/25/2024    CO2 24 09/25/2024    CO2 27 03/26/2024    CO2 28 08/05/2023     Lab Results   Component Value Date    HGB 12.8 (L) 09/25/2024    HCT 39.3 (L) 09/25/2024    HCT 37.0 (L) 03/26/2024       Assessment and Plan:   Assessment & Plan    I77.810 Ascending aorta dilation  I25.10 Coronary artery disease involving native coronary artery of native heart without angina pectoris  I10 Essential hypertension  I35.0 Nonrheumatic aortic valve stenosis  I65.23 Carotid stenosis, asymptomatic, bilateral  E78.2 Mixed hyperlipidemia    IMPRESSION:  - Aortic valve stenosis: echocardiogram from 11/24 showed peak aortic valve velocity of 3.1 m/s, mean gradient 23 mmHg.  - Carotid duplex showed <50% stenosis.  - Blood pressure well-controlled on current medication regimen.  - Ascending aorta dilation present, not severely dilated.  - Aortic valve narrowing not yet at intervention threshold.  - CAD risk increased due to calcium buildup.    ASCENDING AORTA DILATION:  - Follow up in 6 months for echocardiogram to reassess ascending aorta.    ESSENTIAL HYPERTENSION:  - Continued losartan 100 mg daily for blood pressure control.    NONRHEUMATIC AORTIC VALVE STENOSIS:  - Follow up in 6 months for echocardiogram to reassess aortic valve.    CAROTID STENOSIS, ASYMPTOMATIC, BILATERAL:  - Follow up in 6 months for carotid duplex.    MIXED HYPERLIPIDEMIA:  - Continued rosuvastatin 20 mg daily for hypercholesterolemia.  - Ordered fasting lipid panel to be completed in the next 1-2 weeks.          Follow up in about 6 months (around 12/17/2025).        Future Appointments   Date Time Provider  Department Center   6/24/2025  7:00 AM LAB[facility] [place]   9/30/2025  9:30 AM Fior Jay MD [facility] PRICRE [place]   12/9/2025 10:00 AM CV [facility] VASCULAR [facility] [place]   12/9/2025 11:00 AM CV [facility] ECHO [facility] [place]       This note was generated with the assistance of ambient listening technology. Verbal consent was obtained by the patient and accompanying visitor(s) for the recording of patient appointment to facilitate this note. I attest to having reviewed and edited the generated note for accuracy, though some syntax or spelling errors may persist. Please contact the author of this note for any clarification.                      [1]   Patient Active Problem List  Diagnosis    Primary osteoarthritis of left knee    Bipolar I disorder    Essential hypertension    Nonrheumatic aortic valve stenosis    Overweight    Prediabetes    Senile purpura    Hypothyroidism    Ascending aorta dilation    Coronary artery disease involving native coronary artery of native heart without angina pectoris    Mixed hyperlipidemia    Hordeolum externum of left lower eyelid    Chronic sinusitis    Rhinitis    Erectile dysfunction due to arterial insufficiency    BPH with urinary obstruction   [2]   Current Outpatient Medications:     buPROPion (WELLBUTRIN XL) 150 MG TB24 tablet, Take 1 tablet (150 mg total) by mouth once daily., Disp: 30 tablet, Rfl: 11    buPROPion (WELLBUTRIN XL) 300 MG 24 hr tablet, Take 1 tablet (300 mg total) by mouth once daily., Disp: 90 tablet, Rfl: 3    clonazePAM (KLONOPIN) 0.5 MG tablet, TAKE 1 TABLET (0.5 MG TOTAL) BY MOUTH 4 (FOUR) TIMES DAILY AS NEEDED FOR ANXIETY., Disp: 120 tablet, Rfl: 5    lamoTRIgine (LAMICTAL) 200 MG tablet, Take 1 tablet (200 mg total) by mouth once daily., Disp: 90 tablet, Rfl: 3    levothyroxine (SYNTHROID) 88 MCG tablet, Take 1 tablet (88 mcg total) by mouth before breakfast., Disp: 90 tablet, Rfl: 3    losartan (COZAAR) 100 MG tablet, Take 1  tablet (100 mg total) by mouth once daily., Disp: 90 tablet, Rfl: 3    rosuvastatin (CRESTOR) 20 MG tablet, Take 1 tablet (20 mg total) by mouth every evening., Disp: 90 tablet, Rfl: 3

## 2025-06-26 ENCOUNTER — LAB VISIT (OUTPATIENT)
Dept: LAB | Facility: HOSPITAL | Age: 80
End: 2025-06-26
Attending: INTERNAL MEDICINE
Payer: MEDICARE

## 2025-06-26 DIAGNOSIS — I10 ESSENTIAL HYPERTENSION: ICD-10-CM

## 2025-06-26 DIAGNOSIS — I25.10 CORONARY ARTERY DISEASE INVOLVING NATIVE CORONARY ARTERY OF NATIVE HEART WITHOUT ANGINA PECTORIS: ICD-10-CM

## 2025-06-26 DIAGNOSIS — E78.2 MIXED HYPERLIPIDEMIA: ICD-10-CM

## 2025-06-26 LAB
ABSOLUTE EOSINOPHIL (OHS): 0.19 K/UL
ABSOLUTE MONOCYTE (OHS): 0.97 K/UL (ref 0.3–1)
ABSOLUTE NEUTROPHIL COUNT (OHS): 3.62 K/UL (ref 1.8–7.7)
ALBUMIN SERPL BCP-MCNC: 4 G/DL (ref 3.5–5.2)
ALP SERPL-CCNC: 68 UNIT/L (ref 40–150)
ALT SERPL W/O P-5'-P-CCNC: 43 UNIT/L (ref 10–44)
ANION GAP (OHS): 5 MMOL/L (ref 8–16)
AST SERPL-CCNC: 29 UNIT/L (ref 11–45)
BASOPHILS # BLD AUTO: 0.07 K/UL
BASOPHILS NFR BLD AUTO: 1 %
BILIRUB SERPL-MCNC: 0.4 MG/DL (ref 0.1–1)
BUN SERPL-MCNC: 21 MG/DL (ref 8–23)
CALCIUM SERPL-MCNC: 9.2 MG/DL (ref 8.7–10.5)
CHLORIDE SERPL-SCNC: 105 MMOL/L (ref 95–110)
CHOLEST SERPL-MCNC: 132 MG/DL (ref 120–199)
CHOLEST/HDLC SERPL: 2.7 {RATIO} (ref 2–5)
CO2 SERPL-SCNC: 28 MMOL/L (ref 23–29)
CREAT SERPL-MCNC: 1.2 MG/DL (ref 0.5–1.4)
ERYTHROCYTE [DISTWIDTH] IN BLOOD BY AUTOMATED COUNT: 13.5 % (ref 11.5–14.5)
GFR SERPLBLD CREATININE-BSD FMLA CKD-EPI: >60 ML/MIN/1.73/M2
GLUCOSE SERPL-MCNC: 103 MG/DL (ref 70–110)
HCT VFR BLD AUTO: 38.7 % (ref 40–54)
HDLC SERPL-MCNC: 49 MG/DL (ref 40–75)
HDLC SERPL: 37.1 % (ref 20–50)
HGB BLD-MCNC: 12.5 GM/DL (ref 14–18)
IMM GRANULOCYTES # BLD AUTO: 0.02 K/UL (ref 0–0.04)
IMM GRANULOCYTES NFR BLD AUTO: 0.3 % (ref 0–0.5)
LDLC SERPL CALC-MCNC: 68 MG/DL (ref 63–159)
LYMPHOCYTES # BLD AUTO: 2.46 K/UL (ref 1–4.8)
MCH RBC QN AUTO: 30.9 PG (ref 27–31)
MCHC RBC AUTO-ENTMCNC: 32.3 G/DL (ref 32–36)
MCV RBC AUTO: 96 FL (ref 82–98)
NONHDLC SERPL-MCNC: 83 MG/DL
NUCLEATED RBC (/100WBC) (OHS): 0 /100 WBC
PLATELET # BLD AUTO: 185 K/UL (ref 150–450)
PMV BLD AUTO: 11.1 FL (ref 9.2–12.9)
POTASSIUM SERPL-SCNC: 5.4 MMOL/L (ref 3.5–5.1)
PROT SERPL-MCNC: 6.8 GM/DL (ref 6–8.4)
RBC # BLD AUTO: 4.05 M/UL (ref 4.6–6.2)
RELATIVE EOSINOPHIL (OHS): 2.6 %
RELATIVE LYMPHOCYTE (OHS): 33.6 % (ref 18–48)
RELATIVE MONOCYTE (OHS): 13.2 % (ref 4–15)
RELATIVE NEUTROPHIL (OHS): 49.3 % (ref 38–73)
SODIUM SERPL-SCNC: 138 MMOL/L (ref 136–145)
TRIGL SERPL-MCNC: 75 MG/DL (ref 30–150)
WBC # BLD AUTO: 7.33 K/UL (ref 3.9–12.7)

## 2025-06-26 PROCEDURE — 82172 ASSAY OF APOLIPOPROTEIN: CPT

## 2025-06-26 PROCEDURE — 80061 LIPID PANEL: CPT | Mod: HCNC

## 2025-06-26 PROCEDURE — 85025 COMPLETE CBC W/AUTO DIFF WBC: CPT | Mod: HCNC

## 2025-06-26 PROCEDURE — 80053 COMPREHEN METABOLIC PANEL: CPT | Mod: HCNC

## 2025-06-26 PROCEDURE — 36415 COLL VENOUS BLD VENIPUNCTURE: CPT

## 2025-06-27 ENCOUNTER — RESULTS FOLLOW-UP (OUTPATIENT)
Dept: CARDIOLOGY | Facility: CLINIC | Age: 80
End: 2025-06-27

## 2025-06-28 LAB — APO B SERPL-MCNC: 59 MG/DL

## 2025-08-25 ENCOUNTER — PATIENT MESSAGE (OUTPATIENT)
Dept: PRIMARY CARE CLINIC | Facility: CLINIC | Age: 80
End: 2025-08-25
Payer: MEDICARE

## 2025-08-25 DIAGNOSIS — R68.82 LIBIDO, DECREASED: Primary | ICD-10-CM

## 2025-08-27 ENCOUNTER — LAB VISIT (OUTPATIENT)
Dept: LAB | Facility: HOSPITAL | Age: 80
End: 2025-08-27
Attending: INTERNAL MEDICINE
Payer: MEDICARE

## 2025-08-27 DIAGNOSIS — R68.82 LIBIDO, DECREASED: ICD-10-CM

## 2025-08-27 LAB — TESTOST SERPL-MCNC: 805 NG/DL (ref 304–1227)

## 2025-08-27 PROCEDURE — 84403 ASSAY OF TOTAL TESTOSTERONE: CPT | Mod: HCNC

## 2025-08-27 PROCEDURE — 36415 COLL VENOUS BLD VENIPUNCTURE: CPT
